# Patient Record
Sex: FEMALE | Race: BLACK OR AFRICAN AMERICAN | NOT HISPANIC OR LATINO | Employment: UNEMPLOYED | URBAN - METROPOLITAN AREA
[De-identification: names, ages, dates, MRNs, and addresses within clinical notes are randomized per-mention and may not be internally consistent; named-entity substitution may affect disease eponyms.]

---

## 2023-08-16 PROBLEM — F32.9 MAJOR DEPRESSION: Status: ACTIVE | Noted: 2023-08-16

## 2023-08-16 PROBLEM — M25.532 LEFT WRIST PAIN: Status: ACTIVE | Noted: 2023-08-16

## 2023-08-16 PROBLEM — G47.00 INSOMNIA: Status: ACTIVE | Noted: 2023-08-16

## 2023-08-16 PROBLEM — M19.132 SCAPHOLUNATE ADVANCED COLLAPSE OF LEFT WRIST: Status: ACTIVE | Noted: 2023-08-16

## 2023-08-16 PROBLEM — F41.9 ANXIETY: Status: ACTIVE | Noted: 2023-08-16

## 2023-08-16 PROBLEM — M19.131 POST-TRAUMATIC ARTHRITIS OF RIGHT WRIST: Status: ACTIVE | Noted: 2023-08-16

## 2023-08-16 PROBLEM — F43.10 POST TRAUMATIC STRESS DISORDER: Status: ACTIVE | Noted: 2023-08-16

## 2023-08-16 PROBLEM — M77.8 RIGHT HAND TENDONITIS: Status: ACTIVE | Noted: 2023-08-16

## 2023-08-16 PROBLEM — G89.29 CHRONIC PATELLOFEMORAL PAIN OF LEFT KNEE: Status: ACTIVE | Noted: 2023-08-16

## 2023-08-16 PROBLEM — M25.562 CHRONIC PATELLOFEMORAL PAIN OF LEFT KNEE: Status: ACTIVE | Noted: 2023-08-16

## 2023-08-16 PROBLEM — F43.21 GRIEF: Status: ACTIVE | Noted: 2023-08-16

## 2023-08-16 PROBLEM — R52 PAIN: Status: ACTIVE | Noted: 2023-08-16

## 2023-08-16 RX ORDER — CYCLOBENZAPRINE HCL 5 MG
TABLET ORAL
COMMUNITY
End: 2023-12-21 | Stop reason: ALTCHOICE

## 2023-08-16 RX ORDER — BACITRACIN 500 [USP'U]/G
1 OINTMENT OPHTHALMIC EVERY 6 HOURS PRN
COMMUNITY
Start: 2014-06-04 | End: 2023-12-21 | Stop reason: ALTCHOICE

## 2023-08-16 RX ORDER — AMLODIPINE BESYLATE 2.5 MG/1
1 TABLET ORAL DAILY
COMMUNITY

## 2023-08-16 RX ORDER — LORATADINE 10 MG/1
TABLET ORAL
COMMUNITY
Start: 2018-06-06 | End: 2023-12-21 | Stop reason: ALTCHOICE

## 2023-08-16 RX ORDER — TRIAMCINOLONE ACETONIDE 5 MG/G
CREAM TOPICAL DAILY PRN
COMMUNITY

## 2023-08-16 RX ORDER — VALACYCLOVIR HYDROCHLORIDE 1 G/1
1000 TABLET, FILM COATED ORAL 2 TIMES DAILY PRN
COMMUNITY
End: 2024-06-02

## 2023-08-16 RX ORDER — MELOXICAM 15 MG/1
TABLET ORAL
COMMUNITY
End: 2023-12-21 | Stop reason: ALTCHOICE

## 2023-08-16 RX ORDER — DOXEPIN 6 MG/1
1 TABLET, FILM COATED ORAL NIGHTLY PRN
COMMUNITY
Start: 2022-05-10 | End: 2024-05-07 | Stop reason: SDUPTHER

## 2023-08-16 RX ORDER — KETOROLAC TROMETHAMINE 10 MG/1
1 TABLET, FILM COATED ORAL EVERY 6 HOURS PRN
COMMUNITY
End: 2023-12-21 | Stop reason: ALTCHOICE

## 2023-08-16 RX ORDER — LISINOPRIL 10 MG/1
TABLET ORAL
COMMUNITY
End: 2023-12-21 | Stop reason: ALTCHOICE

## 2023-08-16 RX ORDER — OXYCODONE AND ACETAMINOPHEN 5; 325 MG/1; MG/1
1 TABLET ORAL 2 TIMES DAILY PRN
COMMUNITY

## 2023-08-16 RX ORDER — PREDNISONE 20 MG/1
TABLET ORAL
COMMUNITY
End: 2023-12-21 | Stop reason: ALTCHOICE

## 2023-08-16 RX ORDER — ATENOLOL AND CHLORTHALIDONE TABLET 50; 25 MG/1; MG/1
1 TABLET ORAL DAILY
COMMUNITY

## 2023-08-16 RX ORDER — ATORVASTATIN CALCIUM 20 MG/1
1 TABLET, FILM COATED ORAL NIGHTLY
COMMUNITY

## 2023-08-16 RX ORDER — IBUPROFEN 600 MG/1
TABLET ORAL
COMMUNITY
End: 2023-12-21 | Stop reason: ALTCHOICE

## 2023-08-16 RX ORDER — DOCUSATE SODIUM 100 MG/1
1 CAPSULE, LIQUID FILLED ORAL DAILY PRN
COMMUNITY
End: 2024-06-02

## 2023-08-16 RX ORDER — SERTRALINE HYDROCHLORIDE 50 MG/1
1 TABLET, FILM COATED ORAL DAILY
COMMUNITY
End: 2024-01-09 | Stop reason: SDUPTHER

## 2023-08-16 RX ORDER — ZOLPIDEM TARTRATE 10 MG/1
1 TABLET ORAL NIGHTLY PRN
COMMUNITY
Start: 2019-04-30 | End: 2024-01-09 | Stop reason: SDUPTHER

## 2023-08-16 RX ORDER — KETOTIFEN FUMARATE 0.35 MG/ML
1 SOLUTION/ DROPS OPHTHALMIC 2 TIMES DAILY PRN
COMMUNITY

## 2023-08-17 RX ORDER — ALBUTEROL SULFATE 0.83 MG/ML
2.5 SOLUTION RESPIRATORY (INHALATION)
COMMUNITY

## 2023-08-17 RX ORDER — ALBUTEROL SULFATE 90 UG/1
2 AEROSOL, METERED RESPIRATORY (INHALATION) EVERY 6 HOURS PRN
COMMUNITY

## 2023-10-02 ENCOUNTER — APPOINTMENT (OUTPATIENT)
Dept: BEHAVIORAL HEALTH | Facility: CLINIC | Age: 48
End: 2023-10-02
Payer: MEDICAID

## 2023-10-03 ENCOUNTER — APPOINTMENT (OUTPATIENT)
Dept: BEHAVIORAL HEALTH | Facility: CLINIC | Age: 48
End: 2023-10-03
Payer: MEDICAID

## 2023-10-04 ENCOUNTER — APPOINTMENT (OUTPATIENT)
Dept: BEHAVIORAL HEALTH | Facility: CLINIC | Age: 48
End: 2023-10-04
Payer: MEDICAID

## 2023-10-05 ENCOUNTER — APPOINTMENT (OUTPATIENT)
Dept: BEHAVIORAL HEALTH | Facility: CLINIC | Age: 48
End: 2023-10-05
Payer: MEDICAID

## 2023-10-05 ENCOUNTER — SOCIAL WORK (OUTPATIENT)
Dept: BEHAVIORAL HEALTH | Facility: CLINIC | Age: 48
End: 2023-10-05
Payer: MEDICAID

## 2023-10-05 DIAGNOSIS — F43.10 PTSD (POST-TRAUMATIC STRESS DISORDER): ICD-10-CM

## 2023-10-05 PROCEDURE — 90834 PSYTX W PT 45 MINUTES: CPT
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
## 2023-10-10 ENCOUNTER — CLINICAL SUPPORT (OUTPATIENT)
Dept: BEHAVIORAL HEALTH | Facility: CLINIC | Age: 48
End: 2023-10-10
Payer: MEDICAID

## 2023-10-10 DIAGNOSIS — F43.10 PTSD (POST-TRAUMATIC STRESS DISORDER): ICD-10-CM

## 2023-10-10 DIAGNOSIS — F33.2 SEVERE RECURRENT MAJOR DEPRESSION WITHOUT PSYCHOTIC FEATURES (MULTI): ICD-10-CM

## 2023-10-10 DIAGNOSIS — F41.1 GENERALIZED ANXIETY DISORDER: ICD-10-CM

## 2023-10-10 PROCEDURE — 90853 GROUP PSYCHOTHERAPY: CPT | Mod: GT,HE,U2
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
## 2023-10-19 ENCOUNTER — SOCIAL WORK (OUTPATIENT)
Dept: BEHAVIORAL HEALTH | Facility: CLINIC | Age: 48
End: 2023-10-19
Payer: MEDICAID

## 2023-10-19 DIAGNOSIS — F33.1 MODERATE EPISODE OF RECURRENT MAJOR DEPRESSIVE DISORDER (MULTI): ICD-10-CM

## 2023-10-19 PROCEDURE — 90834 PSYTX W PT 45 MINUTES: CPT

## 2023-10-19 NOTE — PROGRESS NOTES
Type of Interaction: Virtual     Start Time: 10:00am     End Time: 10:45am     Patient states that she's been trying to stay positive despite her continued health issues. Patient's pain her hands has worsened with the cooler weather. Patient went to the ER yesterday and was given pain medication. Patient went to the ER yesterday due to the pain in her hands. Patient is reluctant to take the medication because of the side effects. Patient receptive to seeking services at [facility]. Patient sleeps intermittently throughout the day. The last time she left the house was one week ago. Patient spoke with her son (who is incarcerated) over the phone this week. Patient is trying to rebuild a relationship with her son. She's planning on going to visit him with her daughter. Patient discussing her son's childhood and the role his father played.  Patient's cousin is coming over this Sunday for dinner and she's looking forward to this. Plan to meet again in two weeks.      Appointment: {Appointment:81385}        Interventions Provided: Solution Focused Therapy        Progress Made: Moderate     Response to Intervention: Receptive      Plan: Continue to use coping skills such as journaling and positive aspirations to manage depressive symptoms. Will forward information on [facility] to patient.      Follow Up / Next Appointment:  November 2nd at 11am
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
## 2023-11-30 ENCOUNTER — SOCIAL WORK (OUTPATIENT)
Dept: BEHAVIORAL HEALTH | Facility: CLINIC | Age: 48
End: 2023-11-30
Payer: MEDICAID

## 2023-11-30 DIAGNOSIS — F41.9 ANXIETY AND DEPRESSION: ICD-10-CM

## 2023-11-30 DIAGNOSIS — F32.A ANXIETY AND DEPRESSION: ICD-10-CM

## 2023-11-30 PROCEDURE — 90832 PSYTX W PT 30 MINUTES: CPT

## 2023-11-30 NOTE — PROGRESS NOTES
Collaborative Care (CoCM)  Progress Note    Type of Interaction: Virtual    Start Time: 9:00am    End Time: 10:00am    Patient reports that she has scheduled surgery on her hands. Patients is appropriately nervous, wants it to be successful. Patient is trying to stay positive during this time. Patient's daughter has been staying at her house with her. Patient states that having someone else in the house helps to keep her awake during the day.  Patient has been walking on her treadmill. This has helped with both her energy level and mood. Patient wants to be consistent with this. She has an upcoming court hearing for her workman's compensation. Encouraged patient that things seem to be coming forward in a positive way.     Appointment: Scheduled      Interventions Provided: Solution Focused Therapy      Progress Made: Moderate    Response to Intervention: Receptive     Plan:   Continue to encourage self care with patient having a routine to her day     Follow Up / Next Appointment:      December 22nd at 9am

## 2023-12-14 ENCOUNTER — SOCIAL WORK (OUTPATIENT)
Dept: BEHAVIORAL HEALTH | Facility: CLINIC | Age: 48
End: 2023-12-14
Payer: MEDICAID

## 2023-12-14 DIAGNOSIS — F33.1 MODERATE EPISODE OF RECURRENT MAJOR DEPRESSIVE DISORDER (MULTI): ICD-10-CM

## 2023-12-14 PROCEDURE — 90834 PSYTX W PT 45 MINUTES: CPT
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
## 2023-12-15 DIAGNOSIS — M19.131 POSTTRAUMATIC ARTHRITIS OF WRIST, RIGHT: ICD-10-CM

## 2023-12-17 PROBLEM — M19.131: Status: ACTIVE | Noted: 2023-12-15

## 2023-12-21 ENCOUNTER — OFFICE VISIT (OUTPATIENT)
Dept: ORTHOPEDIC SURGERY | Facility: CLINIC | Age: 48
End: 2023-12-21
Payer: MEDICAID

## 2023-12-21 ENCOUNTER — TELEMEDICINE CLINICAL SUPPORT (OUTPATIENT)
Dept: PREADMISSION TESTING | Facility: HOSPITAL | Age: 48
End: 2023-12-21
Payer: MEDICAID

## 2023-12-21 VITALS — HEIGHT: 63 IN | BODY MASS INDEX: 34.73 KG/M2 | WEIGHT: 196 LBS

## 2023-12-21 VITALS — BODY MASS INDEX: 34.73 KG/M2 | WEIGHT: 196 LBS | HEIGHT: 63 IN

## 2023-12-21 DIAGNOSIS — M19.131 POST-TRAUMATIC ARTHRITIS OF RIGHT WRIST: Primary | ICD-10-CM

## 2023-12-21 DIAGNOSIS — Z02.6 ENCOUNTER RELATED TO WORKER'S COMPENSATION CLAIM: ICD-10-CM

## 2023-12-21 PROCEDURE — 99213 OFFICE O/P EST LOW 20 MIN: CPT | Performed by: ORTHOPAEDIC SURGERY

## 2023-12-21 PROCEDURE — 1036F TOBACCO NON-USER: CPT | Performed by: ORTHOPAEDIC SURGERY

## 2023-12-21 RX ORDER — ASCORBIC ACID 500 MG
1000 TABLET ORAL DAILY
COMMUNITY
End: 2024-06-02

## 2023-12-21 RX ORDER — VITAMIN E MIXED 400 UNIT
400 CAPSULE ORAL DAILY
COMMUNITY
End: 2024-06-02

## 2023-12-21 ASSESSMENT — PAIN DESCRIPTION - DESCRIPTORS: DESCRIPTORS: ACHING

## 2023-12-21 ASSESSMENT — PAIN SCALES - GENERAL: PAINLEVEL_OUTOF10: 6

## 2023-12-21 ASSESSMENT — PAIN - FUNCTIONAL ASSESSMENT: PAIN_FUNCTIONAL_ASSESSMENT: 0-10

## 2023-12-21 NOTE — H&P (VIEW-ONLY)
Patient returns for her wrist. She is on my OR schedule next week for conversion of a midcarpal fusion of the right wrist to a total wrist fusion. She is eagerly anticipating that surgery to help with her pain relief. She is here primarily for her left side. 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     Past medical history, medications, allergies, surgical history and review of systems are reviewed and otherwise unchanged when compared to last visit on 9/05/2023.    Physical Examination Findings:   Constitutional: Oriented to person, place, and time. Appears well-developed and well-nourished.   Head: Normocephalic and atraumatic.   Eyes: Pupils are equal, round, and reactive to light.   Cardiovascular: Intact distal pulses.   Pulmonary/Chest/Breast: Effort normal. No respiratory distress.   Neurological: Alert and oriented to person, place, and time.   Skin: Skin is warm and dry.   Psychiatric: Normal mood and affect. Behavior is normal.   Musculoskeletal: Right wrist examination reveals tenderness to palpation over the dorsal radiocarpal joint. She has limitations to wrist flexion and extension. She maintains near full forearm rotation and near full digital range of motion. Sensation subjectively normal today. Mildly positive provocative maneuvers for carpal tunnel syndrome.     Impression: Bilateral wrist arthritis    Plan: We will proceed with the right wrist fusion next week but I have recommended that we hold off on submitting a C-9 for her left wrist surgery because we will not be able to perform it before the C-9 expires. As she is getting through her recovery from her right wrist surgery and she feels that she is ready to give up her left hand, that is when I will submit the C-9 and request approval for a left  wrist proximal row carpectomy.     Emilio Paz MD    [facility] [employer]  Department of Orthopaedic Surgery  Chief of Hand and Upper Extremity Surgery  [facility]    By signing my name below, I, Bishop Johansen, attest that this documentation has been prepared under the direction and in the presence of Dr. Emilio Paz.   All medical record entries made by the Jennieibe were at my direction and personally dictated by me. I have reviewed the chart and agree that the record accurately reflects my personal performance of the history, physical exam, discussion and plan.

## 2023-12-21 NOTE — PREPROCEDURE INSTRUCTIONS
Current Medications   Medication Instructions    albuterol 2.5 mg /3 mL (0.083 %) nebulizer solution Use as needed    albuterol 90 mcg/actuation inhaler Uses as needed    amLODIPine (Norvasc) 2.5 mg tablet Take morning of surgery with sip of water, no other fluids, takes at night    ascorbic acid (Vitamin C) 500 mg tablet Stop 7 days before surgery    atenoloL-chlorthalidone (Tenoretic) 50-25 mg tablet Take morning of surgery with sip of water, no other fluids (takes at night)    atorvastatin (Lipitor) 20 mg tablet Ok to take night before    B complex-vitamin C-folic acid (Nephro-Altagracia Rx) 1- mg-mg-mcg tablet Stop 7 days before surgery    cetirizine (ZyrTEC) 10 mg capsule Continue until night before surgery, takes at night    docusate sodium (Colace) 100 mg capsule Stop 1 day before surgery    doxepin (Silenor) 6 mg tablet Continue until night before surgery    oxyCODONE-acetaminophen (Percocet) 5-325 mg tablet Take morning of surgery with sip of water, no other fluids as needed    sertraline (Zoloft) 50 mg tablet Continue until night before surgery, takes at night    triamcinolone (Kenalog) 0.5 % cream Stop 1 day before surgery    vitamin E 180 mg (400 unit) capsule Stop 7 days before surgery    zolpidem (Ambien) 10 mg tablet Continue until night before surgery                       NPO Instructions:    Do not eat any food after midnight the night before your surgery/procedure.    Additional Instructions:     Seven/Six Days before Surgery:  We have sent a prescription for Hibiclens soap to your preferred pharmacy.  If you have not already, Please  your prescription and start using five days before surgery.  Follow the instruction sheet provided to you at your CPM/PAT appointment.  Five Days before Surgery:  Review your medication instructions, stop indicated medications  Three Days before Surgery:  Review your medication instructions, stop indicated medications  The Day before Surgery:  Review your  medication instructions, stop indicated medications  Day of Surgery:  Review your medication instructions, take indicated medications  Wear  comfortable loose fitting clothing  Do not use moisturizers, creams, lotions or perfume  All jewelry and valuables should be left at home      All instructions given via phone screening.

## 2023-12-21 NOTE — PROGRESS NOTES
Patient returns for her wrist. She is on my OR schedule next week for conversion of a midcarpal fusion of the right wrist to a total wrist fusion. She is eagerly anticipating that surgery to help with her pain relief. She is here primarily for her left side. 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     Past medical history, medications, allergies, surgical history and review of systems are reviewed and otherwise unchanged when compared to last visit on 9/05/2023.    Physical Examination Findings:   Constitutional: Oriented to person, place, and time. Appears well-developed and well-nourished.   Head: Normocephalic and atraumatic.   Eyes: Pupils are equal, round, and reactive to light.   Cardiovascular: Intact distal pulses.   Pulmonary/Chest/Breast: Effort normal. No respiratory distress.   Neurological: Alert and oriented to person, place, and time.   Skin: Skin is warm and dry.   Psychiatric: Normal mood and affect. Behavior is normal.   Musculoskeletal: Right wrist examination reveals tenderness to palpation over the dorsal radiocarpal joint. She has limitations to wrist flexion and extension. She maintains near full forearm rotation and near full digital range of motion. Sensation subjectively normal today. Mildly positive provocative maneuvers for carpal tunnel syndrome.     Impression: Bilateral wrist arthritis    Plan: We will proceed with the right wrist fusion next week but I have recommended that we hold off on submitting a C-9 for her left wrist surgery because we will not be able to perform it before the C-9 expires. As she is getting through her recovery from her right wrist surgery and she feels that she is ready to give up her left hand, that is when I will submit the C-9 and request approval for a left  wrist proximal row carpectomy.     Emilio Paz MD    [facility] [employer]  Department of Orthopaedic Surgery  Chief of Hand and Upper Extremity Surgery  [facility]    By signing my name below, I, Bishop Johansen, attest that this documentation has been prepared under the direction and in the presence of Dr. Emilio Paz.   All medical record entries made by the Jennieibe were at my direction and personally dictated by me. I have reviewed the chart and agree that the record accurately reflects my personal performance of the history, physical exam, discussion and plan.

## 2023-12-21 NOTE — NURSING NOTE
Phone screening done 12/21/23.  All meds and preop instructions given and explained. Edilbetro COUCH

## 2023-12-22 ENCOUNTER — SOCIAL WORK (OUTPATIENT)
Dept: BEHAVIORAL HEALTH | Facility: CLINIC | Age: 48
End: 2023-12-22
Payer: MEDICAID

## 2023-12-22 DIAGNOSIS — F43.23 ADJUSTMENT DISORDER WITH MIXED ANXIETY AND DEPRESSED MOOD: ICD-10-CM

## 2023-12-22 PROCEDURE — 90832 PSYTX W PT 30 MINUTES: CPT
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
## 2023-12-26 ENCOUNTER — APPOINTMENT (OUTPATIENT)
Dept: BEHAVIORAL HEALTH | Facility: CLINIC | Age: 48
End: 2023-12-26
Payer: COMMERCIAL

## 2023-12-27 ENCOUNTER — HOSPITAL ENCOUNTER (OUTPATIENT)
Facility: HOSPITAL | Age: 48
Setting detail: OUTPATIENT SURGERY
Discharge: HOME | End: 2023-12-27
Attending: ORTHOPAEDIC SURGERY | Admitting: ORTHOPAEDIC SURGERY
Payer: COMMERCIAL

## 2023-12-27 ENCOUNTER — ANESTHESIA (OUTPATIENT)
Dept: OPERATING ROOM | Facility: HOSPITAL | Age: 48
End: 2023-12-27
Payer: COMMERCIAL

## 2023-12-27 ENCOUNTER — ANESTHESIA EVENT (OUTPATIENT)
Dept: OPERATING ROOM | Facility: HOSPITAL | Age: 48
End: 2023-12-27
Payer: COMMERCIAL

## 2023-12-27 ENCOUNTER — APPOINTMENT (OUTPATIENT)
Dept: RADIOLOGY | Facility: HOSPITAL | Age: 48
End: 2023-12-27
Payer: COMMERCIAL

## 2023-12-27 VITALS
OXYGEN SATURATION: 97 % | RESPIRATION RATE: 13 BRPM | DIASTOLIC BLOOD PRESSURE: 90 MMHG | SYSTOLIC BLOOD PRESSURE: 137 MMHG | WEIGHT: 193.34 LBS | HEIGHT: 63 IN | TEMPERATURE: 97 F | BODY MASS INDEX: 34.26 KG/M2 | HEART RATE: 69 BPM

## 2023-12-27 DIAGNOSIS — G89.18 POST-OP PAIN: ICD-10-CM

## 2023-12-27 DIAGNOSIS — M19.131 POSTTRAUMATIC ARTHRITIS OF WRIST, RIGHT: Primary | ICD-10-CM

## 2023-12-27 LAB — HCG UR QL IA.RAPID: NEGATIVE

## 2023-12-27 PROCEDURE — 7100000002 HC RECOVERY ROOM TIME - EACH INCREMENTAL 1 MINUTE: Performed by: ORTHOPAEDIC SURGERY

## 2023-12-27 PROCEDURE — 7100000010 HC PHASE TWO TIME - EACH INCREMENTAL 1 MINUTE: Performed by: ORTHOPAEDIC SURGERY

## 2023-12-27 PROCEDURE — 3600000008 HC OR TIME - EACH INCREMENTAL 1 MINUTE - PROCEDURE LEVEL THREE: Performed by: ORTHOPAEDIC SURGERY

## 2023-12-27 PROCEDURE — 81025 URINE PREGNANCY TEST: CPT | Performed by: ORTHOPAEDIC SURGERY

## 2023-12-27 PROCEDURE — 3600000003 HC OR TIME - INITIAL BASE CHARGE - PROCEDURE LEVEL THREE: Performed by: ORTHOPAEDIC SURGERY

## 2023-12-27 PROCEDURE — 76000 FLUOROSCOPY <1 HR PHYS/QHP: CPT | Mod: 59

## 2023-12-27 PROCEDURE — 2500000004 HC RX 250 GENERAL PHARMACY W/ HCPCS (ALT 636 FOR OP/ED): Performed by: ANESTHESIOLOGY

## 2023-12-27 PROCEDURE — 2780000003 HC OR 278 NO HCPCS: Performed by: ORTHOPAEDIC SURGERY

## 2023-12-27 PROCEDURE — 3700000002 HC GENERAL ANESTHESIA TIME - EACH INCREMENTAL 1 MINUTE: Performed by: ORTHOPAEDIC SURGERY

## 2023-12-27 PROCEDURE — 2500000004 HC RX 250 GENERAL PHARMACY W/ HCPCS (ALT 636 FOR OP/ED): Performed by: ORTHOPAEDIC SURGERY

## 2023-12-27 PROCEDURE — 25825 ARTHRD WRIST WITH AUTOGRAFT: CPT | Performed by: ORTHOPAEDIC SURGERY

## 2023-12-27 PROCEDURE — 2500000004 HC RX 250 GENERAL PHARMACY W/ HCPCS (ALT 636 FOR OP/ED): Performed by: NURSE ANESTHETIST, CERTIFIED REGISTERED

## 2023-12-27 PROCEDURE — 7100000009 HC PHASE TWO TIME - INITIAL BASE CHARGE: Performed by: ORTHOPAEDIC SURGERY

## 2023-12-27 PROCEDURE — 7100000001 HC RECOVERY ROOM TIME - INITIAL BASE CHARGE: Performed by: ORTHOPAEDIC SURGERY

## 2023-12-27 PROCEDURE — A25800 PR FUSION OF WRIST JOINT: Performed by: NURSE ANESTHETIST, CERTIFIED REGISTERED

## 2023-12-27 PROCEDURE — 3700000001 HC GENERAL ANESTHESIA TIME - INITIAL BASE CHARGE: Performed by: ORTHOPAEDIC SURGERY

## 2023-12-27 PROCEDURE — C1713 ANCHOR/SCREW BN/BN,TIS/BN: HCPCS | Performed by: ORTHOPAEDIC SURGERY

## 2023-12-27 PROCEDURE — 2500000001 HC RX 250 WO HCPCS SELF ADMINISTERED DRUGS (ALT 637 FOR MEDICARE OP): Performed by: ANESTHESIOLOGY

## 2023-12-27 PROCEDURE — A25800 PR FUSION OF WRIST JOINT: Performed by: ANESTHESIOLOGY

## 2023-12-27 DEVICE — IMPLANTABLE DEVICE: Type: IMPLANTABLE DEVICE | Site: WRIST | Status: FUNCTIONAL

## 2023-12-27 RX ORDER — SODIUM CHLORIDE, SODIUM LACTATE, POTASSIUM CHLORIDE, CALCIUM CHLORIDE 600; 310; 30; 20 MG/100ML; MG/100ML; MG/100ML; MG/100ML
50 INJECTION, SOLUTION INTRAVENOUS CONTINUOUS
Status: DISCONTINUED | OUTPATIENT
Start: 2023-12-27 | End: 2023-12-27 | Stop reason: HOSPADM

## 2023-12-27 RX ORDER — ONDANSETRON HYDROCHLORIDE 2 MG/ML
INJECTION, SOLUTION INTRAVENOUS AS NEEDED
Status: DISCONTINUED | OUTPATIENT
Start: 2023-12-27 | End: 2023-12-27

## 2023-12-27 RX ORDER — FAMOTIDINE 20 MG/1
20 TABLET, FILM COATED ORAL ONCE
Status: COMPLETED | OUTPATIENT
Start: 2023-12-27 | End: 2023-12-27

## 2023-12-27 RX ORDER — FENTANYL CITRATE 50 UG/ML
50 INJECTION, SOLUTION INTRAMUSCULAR; INTRAVENOUS ONCE
Status: COMPLETED | OUTPATIENT
Start: 2023-12-27 | End: 2023-12-27

## 2023-12-27 RX ORDER — OXYCODONE HCL 10 MG/1
10 TABLET, FILM COATED, EXTENDED RELEASE ORAL ONCE AS NEEDED
Status: DISCONTINUED | OUTPATIENT
Start: 2023-12-27 | End: 2023-12-27 | Stop reason: HOSPADM

## 2023-12-27 RX ORDER — MEPERIDINE HYDROCHLORIDE 25 MG/ML
12.5 INJECTION INTRAMUSCULAR; INTRAVENOUS; SUBCUTANEOUS EVERY 10 MIN PRN
Status: DISCONTINUED | OUTPATIENT
Start: 2023-12-27 | End: 2023-12-27 | Stop reason: HOSPADM

## 2023-12-27 RX ORDER — ALBUTEROL SULFATE 0.83 MG/ML
2.5 SOLUTION RESPIRATORY (INHALATION) ONCE AS NEEDED
Status: DISCONTINUED | OUTPATIENT
Start: 2023-12-27 | End: 2023-12-27 | Stop reason: HOSPADM

## 2023-12-27 RX ORDER — FENTANYL CITRATE 50 UG/ML
INJECTION, SOLUTION INTRAMUSCULAR; INTRAVENOUS AS NEEDED
Status: DISCONTINUED | OUTPATIENT
Start: 2023-12-27 | End: 2023-12-27

## 2023-12-27 RX ORDER — KETOROLAC TROMETHAMINE 30 MG/ML
INJECTION, SOLUTION INTRAMUSCULAR; INTRAVENOUS AS NEEDED
Status: DISCONTINUED | OUTPATIENT
Start: 2023-12-27 | End: 2023-12-27

## 2023-12-27 RX ORDER — DIPHENHYDRAMINE HYDROCHLORIDE 50 MG/ML
12.5 INJECTION INTRAMUSCULAR; INTRAVENOUS ONCE AS NEEDED
Status: DISCONTINUED | OUTPATIENT
Start: 2023-12-27 | End: 2023-12-27 | Stop reason: HOSPADM

## 2023-12-27 RX ORDER — MIDAZOLAM HYDROCHLORIDE 1 MG/ML
2 INJECTION, SOLUTION INTRAMUSCULAR; INTRAVENOUS ONCE
Status: COMPLETED | OUTPATIENT
Start: 2023-12-27 | End: 2023-12-27

## 2023-12-27 RX ORDER — ALBUTEROL SULFATE 0.83 MG/ML
2.5 SOLUTION RESPIRATORY (INHALATION) ONCE
Status: DISCONTINUED | OUTPATIENT
Start: 2023-12-27 | End: 2023-12-27 | Stop reason: HOSPADM

## 2023-12-27 RX ORDER — OXYCODONE HYDROCHLORIDE 5 MG/1
5 TABLET ORAL EVERY 6 HOURS PRN
Qty: 28 TABLET | Refills: 0 | Status: SHIPPED | OUTPATIENT
Start: 2023-12-27 | End: 2024-01-03

## 2023-12-27 RX ORDER — ONDANSETRON HYDROCHLORIDE 2 MG/ML
4 INJECTION, SOLUTION INTRAVENOUS ONCE AS NEEDED
Status: DISCONTINUED | OUTPATIENT
Start: 2023-12-27 | End: 2023-12-27 | Stop reason: HOSPADM

## 2023-12-27 RX ORDER — METOCLOPRAMIDE 10 MG/1
10 TABLET ORAL ONCE
Status: COMPLETED | OUTPATIENT
Start: 2023-12-27 | End: 2023-12-27

## 2023-12-27 RX ORDER — LABETALOL HYDROCHLORIDE 5 MG/ML
10 INJECTION, SOLUTION INTRAVENOUS ONCE AS NEEDED
Status: DISCONTINUED | OUTPATIENT
Start: 2023-12-27 | End: 2023-12-27 | Stop reason: HOSPADM

## 2023-12-27 RX ORDER — CLINDAMYCIN PHOSPHATE 900 MG/50ML
900 INJECTION, SOLUTION INTRAVENOUS ONCE
Status: COMPLETED | OUTPATIENT
Start: 2023-12-27 | End: 2023-12-27

## 2023-12-27 RX ORDER — HYDRALAZINE HYDROCHLORIDE 20 MG/ML
10 INJECTION INTRAMUSCULAR; INTRAVENOUS EVERY 30 MIN PRN
Status: DISCONTINUED | OUTPATIENT
Start: 2023-12-27 | End: 2023-12-27 | Stop reason: HOSPADM

## 2023-12-27 RX ORDER — PROPOFOL 10 MG/ML
INJECTION, EMULSION INTRAVENOUS AS NEEDED
Status: DISCONTINUED | OUTPATIENT
Start: 2023-12-27 | End: 2023-12-27

## 2023-12-27 RX ADMIN — FENTANYL CITRATE 25 MCG: 50 INJECTION INTRAMUSCULAR; INTRAVENOUS at 11:06

## 2023-12-27 RX ADMIN — METOCLOPRAMIDE 10 MG: 10 TABLET ORAL at 09:22

## 2023-12-27 RX ADMIN — KETOROLAC TROMETHAMINE 30 MG: 30 INJECTION, SOLUTION INTRAMUSCULAR; INTRAVENOUS at 11:10

## 2023-12-27 RX ADMIN — MIDAZOLAM 2 MG: 1 INJECTION INTRAMUSCULAR; INTRAVENOUS at 09:32

## 2023-12-27 RX ADMIN — SODIUM CHLORIDE, SODIUM LACTATE, POTASSIUM CHLORIDE, AND CALCIUM CHLORIDE 50 ML/HR: 600; 310; 30; 20 INJECTION, SOLUTION INTRAVENOUS at 09:22

## 2023-12-27 RX ADMIN — FENTANYL CITRATE 50 MCG: 0.05 INJECTION, SOLUTION INTRAMUSCULAR; INTRAVENOUS at 09:31

## 2023-12-27 RX ADMIN — PROPOFOL 200 MG: 10 INJECTION, EMULSION INTRAVENOUS at 09:51

## 2023-12-27 RX ADMIN — CLINDAMYCIN IN 5 PERCENT DEXTROSE 900 MG: 18 INJECTION, SOLUTION INTRAVENOUS at 09:47

## 2023-12-27 RX ADMIN — FAMOTIDINE 20 MG: 20 TABLET, FILM COATED ORAL at 09:22

## 2023-12-27 RX ADMIN — FENTANYL CITRATE 25 MCG: 50 INJECTION INTRAMUSCULAR; INTRAVENOUS at 10:13

## 2023-12-27 RX ADMIN — FENTANYL CITRATE 50 MCG: 50 INJECTION INTRAMUSCULAR; INTRAVENOUS at 09:50

## 2023-12-27 RX ADMIN — ONDANSETRON 4 MG: 2 INJECTION INTRAMUSCULAR; INTRAVENOUS at 11:10

## 2023-12-27 ASSESSMENT — PAIN SCALES - GENERAL
PAINLEVEL_OUTOF10: 0 - NO PAIN
PAIN_LEVEL: 2
PAINLEVEL_OUTOF10: 0 - NO PAIN
PAINLEVEL_OUTOF10: 0 - NO PAIN

## 2023-12-27 ASSESSMENT — PAIN - FUNCTIONAL ASSESSMENT
PAIN_FUNCTIONAL_ASSESSMENT: 0-10

## 2023-12-27 ASSESSMENT — COLUMBIA-SUICIDE SEVERITY RATING SCALE - C-SSRS
6. HAVE YOU EVER DONE ANYTHING, STARTED TO DO ANYTHING, OR PREPARED TO DO ANYTHING TO END YOUR LIFE?: NO
1. IN THE PAST MONTH, HAVE YOU WISHED YOU WERE DEAD OR WISHED YOU COULD GO TO SLEEP AND NOT WAKE UP?: NO
2. HAVE YOU ACTUALLY HAD ANY THOUGHTS OF KILLING YOURSELF?: NO

## 2023-12-27 ASSESSMENT — PAIN DESCRIPTION - DESCRIPTORS: DESCRIPTORS: ACHING

## 2023-12-27 NOTE — OP NOTE
Right wrist arthrodesis (R) Operative Note     Date: 2023  OR Location: [facility]    Name: Rosanna Mckeon, : 1975, Age: 48 y.o., MRN: [id number], Sex: female    Diagnosis  Pre-op Diagnosis     * Posttraumatic arthritis of wrist, right [M19.131] Post-op Diagnosis     * Posttraumatic arthritis of wrist, right [M19.131]     Procedures  Right wrist arthrodesis  66058 - SC ARTHRODESIS WRIST COMPLETE W/O BONE GRAFT      Surgeons      * Emilio Paz - Primary    Resident/Fellow/Other Assistant:  Surgeon(s) and Role:    Procedure Summary  Anesthesia: General  ASA: III  Anesthesia Staff: Anesthesiologist: Daniel Haley DO  CRNA: BAUDILIO Henderson-CRNA  Estimated Blood Loss: 2 mL  Intra-op Medications: * No intraprocedure medications in log *           Anesthesia Record               Intraprocedure I/O Totals       None           Specimen: No specimens collected     Staff:   Circulator: Marcelle Cannon RN  Scrub Person: Anastacia Hernandez PA-C; Mary Mai         Drains and/or Catheters: * None in log *    Tourniquet Times:   * Missing tourniquet times found for documented tourniquets in lo *     Implants:  Implants       Type Name Action Serial No.       wrist fusion plate Implanted       14 inch 2.7 non locking screw Implanted       12 inch 2.7 non locking screw Implanted       16 inch 3.5 non locking screw Implanted       14 inch 3.5 non locking screw Implanted       16 inch 3.5 locking screw Implanted       14 inch 2.7 locking screw Implanted               Findings: Advanced radiocarpal joint arthritis right wrist    Indications: Rosanna Mckeon is an 48 y.o. female who is having surgery for Posttraumatic arthritis of wrist, right [M19.131].      The patient was seen in the preoperative area. The risks, benefits, complications, treatment options, non-operative alternatives, expected recovery and outcomes were discussed with the patient. The possibilities of reaction to medication, pulmonary  aspiration, injury to surrounding structures, bleeding, recurrent infection, the need for additional procedures, failure to diagnose a condition, and creating a complication requiring transfusion or operation were discussed with the patient. The patient concurred with the proposed plan, giving informed consent.  The site of surgery was properly noted/marked if necessary per policy. The patient has been actively warmed in preoperative area. Preoperative antibiotics have been ordered and given within 1 hours of incision. Venous thrombosis prophylaxis have been ordered including bilateral sequential compression devices    Procedure Details:   48-year-old right-hand-dominant female presents today for right wrist total arthrodesis.  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  She had surgery by another surgeon at another institution several years ago for scaphoid excision and midcarpal fusion.  She presented to me following that procedure with pain in her wrist joint thought to be related to prominent hardware in the radiocarpal joint.  We removed the implants and identified full-thickness cartilage loss in the lunate facet.  We have attempted to manage her pain with conservative measures but have been unsuccessful despite conservative management options and pain management.  She has not been able to return to her previous occupation.  She presents now for conversion of her midcarpal fusion to a total wrist fusion.  Preoperatively the right arm was identified and marked for surgery.  Informed consent process was completed.    Patient was brought to the operating room placed supine on the operating table.  Timeout procedure was performed to verify the correct patient procedure and operative site.  Intravenous antibiotics were administered.  General anesthetic was initiated by the anesthesia service.  The right upper  extremity was then prepped and draped in usual sterile fashion.  Limb was exsanguinated and tourniquet was inflated to 250 mmHg.    We used the prior dorsal longitudinal incision for exposure of the wrist joint.  This incision was extended proximally along the dorsal aspect of the radius and distally out to the middle finger metacarpal neck.  We dissected down to the deep fascia.  We identified the extensor pollicis longus tendon which had been rerouted subcutaneously at prior surgery.  This was mobilized and retracted radially.  We then identified the fourth compartment.  The extensor retinaculum of the fourth compartment was released allowing us to retract the fourth compartment tendons ulnarly.  There were dense adhesions between the EDC tendons within that compartment and a tenolysis was performed.  The second compartment was mobilized in a radial direction.  The posterior interosseous nerve had already been divided as part of one of her prior procedures.  We did expose the interosseous membrane proximal to the distal oblique band of the interosseous membrane and created a window in this membrane to reveal the underlying anterior osseous nerve which was then also resected for denervation purposes.    A T-shaped capsulotomy was performed and the capsule was released off of the dorsal rim of the radius taking great care to protect the dorsal radial ulnar ligaments to prevent radial ulnar joint instability.  Once the capsular incision was created and the flaps mobilized we were able to Hyperflex the wrist to expose the proximal surface of the lunate.  Here there was cartilage loss.  This also allowed us to expose the lunate facet where there was a full-thickness cartilage defect likely related to prominence of her prior surgical hardware.  Using a combination of rongeurs and a high-speed bur we removed the remaining cartilage and subchondral bone from the lunate facet of the radius and the proximal articular  surface of the lunate.  Then using a 2 mm drill we also fenestrated the lunate and the radius to improve blood flow and perfusion to the fusion site.  Using a rongeur we removed Monika's tubercle and created a trough in the distal aspect of the dorsal radius for plate positioning.  The bone graft harvested from Monika's tubercle was placed into the radiolunate joint as autogenous bone graft for fusion.    We selected an appropriate plate from the [employer] wrist fusion system.  This was secured to the middle finger metacarpal with 2.7 mm cortical screws distally.  Once the plate position was confirmed appropriate all in the metacarpal we used AO compression technique to secure the plate proximally to the radial shaft.  Fluoroscopic images were obtained again demonstrating appropriate implant position and position of fusion.  We placed two 2.7 mm cortical screws in the most distal holes of the metacarpal plate and two 3.5 mm cortical screws in the most proximal aspect of the plate on the radius.  We then placed 1 additional 3.5 mm locking screw proximally and 1 additional 2.7 mm locking screw distally.  Final fluoroscopic images were obtained and saved to the patient's electronic file.  The wound was copiously irrigated and then closed interrupted fashion.  Sterile bandages were applied and the tourniquet was deflated.  The patient was placed into a well-padded volar wrist splint.  She was awoken from her anesthetic and transferred to recovery in stable condition.    Postoperatively she will be discharged home once comfortable.  She will remain in her splint until she returns to clinic in 2 weeks.  At that time we will transition her into a short arm cast for an additional 3 to 4 weeks before putting her into a removable brace and allowing her to progress with light activities.        Complications:  None; patient tolerated the procedure well.    Disposition: PACU - hemodynamically stable.  Condition: stable          Additional Details:      Attending Attestation: I was present and scrubbed for the entire procedure.    Emilio Paz  Phone Number: [phone]

## 2023-12-27 NOTE — ANESTHESIA PROCEDURE NOTES
Peripheral Block    Patient location during procedure: pre-op  Start time: 12/27/2023 9:37 AM  End time: 12/27/2023 9:39 AM  Reason for block: at surgeon's request  Staffing  Performed: attending   Authorized by: Daniel Haley DO    Performed by: Daniel Haley DO  Preanesthetic Checklist  Completed: patient identified, IV checked, site marked, risks and benefits discussed, surgical consent, monitors and equipment checked, pre-op evaluation and timeout performed   Timeout performed at: 12/27/2023 9:31 AM  Peripheral Block  Patient position: laying flat  Prep: ChloraPrep  Patient monitoring: heart rate, cardiac monitor and continuous pulse ox  Block type: axillary and brachial plexus  Laterality: right  Injection technique: single-shot  Local infiltration: bupivicaine  Infiltration strength: 0.5 %  Dose: 25 mL  Needle  Needle type: short-bevel   Needle gauge: 20 G  Needle length: 10 cm  Needle localization: anatomical landmarks, nerve stimulator and ultrasound guidance  Needle insertion depth: 6 cm  Assessment  Injection assessment: negative aspiration for heme, no paresthesia on injection, incremental injection and local visualized surrounding nerve on ultrasound  Paresthesia pain: none  Heart rate change: no  Slow fractionated injection: no

## 2023-12-27 NOTE — ANESTHESIA PREPROCEDURE EVALUATION
Patient: Rosanna Mckeon    Procedure Information       Date/Time: 12/27/23 1000    Procedure: Right wrist arthrodesis / 75 minutes ( Mini-C-Arm, [place] [employer] wrist fusion plate, Supine, Pre-Surgical Block ) (Right: Wrist)    Location: [facility] / [facility]    Surgeons: Emilio Paz MD            Relevant Problems   Neuro/Psych   (+) Anxiety   (+) Major depression   (+) Post traumatic stress disorder       Clinical information reviewed:   Tobacco  Allergies  Meds   Med Hx  Surg Hx   Fam Hx  Soc Hx        NPO Detail:  NPO/Void Status  Date of Last Liquid: 12/26/23  Time of Last Liquid: 2301  Date of Last Solid: 12/26/23  Time of Last Solid: 2107  Last Intake Type: Clear fluids  Time of Last Void: 0600         Physical Exam    Airway  Mallampati: II  TM distance: >3 FB  Neck ROM: full     Cardiovascular   Rhythm: regular  Rate: normal     Dental - normal exam     Pulmonary   Breath sounds clear to auscultation     Abdominal   Abdomen: soft             Anesthesia Plan    ASA 3     general     intravenous induction   Postoperative administration of opioids is intended.  Anesthetic plan and risks discussed with patient.    Plan discussed with CRNA, attending and CAA.

## 2023-12-27 NOTE — ANESTHESIA PROCEDURE NOTES
Airway  Date/Time: 12/27/2023 9:50 AM  Urgency: elective    Airway not difficult    Staffing  Performed: CRNA   Authorized by: Daniel Haley DO    Performed by: BAUDILIO Henderson-NICCI  Patient location during procedure: OR    Indications and Patient Condition  Indications for airway management: anesthesia and airway protection  Spontaneous ventilation: present  Sedation level: deep  Preoxygenated: yes  Patient position: sniffing  MILS maintained throughout  Mask difficulty assessment: 1 - vent by mask    Final Airway Details  Final airway type: supraglottic airway      Successful airway: classic  Size 4     Number of attempts at approach: 1  Number of other approaches attempted: 0    Additional Comments  LMA lubricated. Atraumatic insertion.

## 2023-12-28 ASSESSMENT — PAIN SCALES - GENERAL: PAINLEVEL_OUTOF10: 0 - NO PAIN

## 2024-01-09 DIAGNOSIS — G47.00 INSOMNIA, UNSPECIFIED TYPE: ICD-10-CM

## 2024-01-09 DIAGNOSIS — F32.9 MAJOR DEPRESSIVE DISORDER, REMISSION STATUS UNSPECIFIED, UNSPECIFIED WHETHER RECURRENT: ICD-10-CM

## 2024-01-09 RX ORDER — SERTRALINE HYDROCHLORIDE 50 MG/1
50 TABLET, FILM COATED ORAL DAILY
Qty: 90 TABLET | Refills: 0 | Status: SHIPPED | OUTPATIENT
Start: 2024-01-09 | End: 2024-03-17 | Stop reason: SDUPTHER

## 2024-01-09 RX ORDER — ZOLPIDEM TARTRATE 10 MG/1
10 TABLET ORAL NIGHTLY PRN
Qty: 30 TABLET | Refills: 0 | Status: SHIPPED | OUTPATIENT
Start: 2024-01-09 | End: 2024-02-28 | Stop reason: SDUPTHER

## 2024-01-11 ENCOUNTER — OFFICE VISIT (OUTPATIENT)
Dept: ORTHOPEDIC SURGERY | Facility: CLINIC | Age: 49
End: 2024-01-11
Payer: MEDICAID

## 2024-01-11 VITALS — WEIGHT: 193 LBS | BODY MASS INDEX: 34.2 KG/M2 | HEIGHT: 63 IN

## 2024-01-11 DIAGNOSIS — Z02.6 ENCOUNTER RELATED TO WORKER'S COMPENSATION CLAIM: ICD-10-CM

## 2024-01-11 DIAGNOSIS — M19.131 POST-TRAUMATIC ARTHRITIS OF RIGHT WRIST: Primary | ICD-10-CM

## 2024-01-11 PROCEDURE — 1036F TOBACCO NON-USER: CPT | Performed by: ORTHOPAEDIC SURGERY

## 2024-01-11 PROCEDURE — 99024 POSTOP FOLLOW-UP VISIT: CPT | Performed by: ORTHOPAEDIC SURGERY

## 2024-01-11 NOTE — PROGRESS NOTES
Date of surgery: 12/27/23         Surgery(s) performed: Right wrist arthrodesis         Patient presents today for her first post-operative visit, performed on 01/11/24. Overall she is doing well. She has some swelling and stiffness of her finger. Her pain is well controled and being managed by her physician of record.        Exam findings: mild swelling of wrist and hand. Healing surgical incisions, sensation is intact to light touch    Impression: Post traumatic arthritis    Plan: She was placed into a short arm fiberglass cast. Cast care was reviewed. She was encouraged to elevate her hand and work on digital range of motion. She will follow up in 4 weeks for a repeat clinical exam and new x-rays. At that time we will transition her to a removable sprint and refer her to therapy. Once she has recovered we can discuss left wrist proximal  row carpectomy.          Emilio Paz MD          [facility] [employer]     Department of Orthopaedic Surgery     Chief of Hand and Upper Extremity Surgery     [facility]      Scribe Attestation  By signing my name below, IJosi Scribe   attest that this documentation has been prepared under the direction and in the presence of Emilio Paz MD.

## 2024-02-06 ENCOUNTER — APPOINTMENT (OUTPATIENT)
Dept: BEHAVIORAL HEALTH | Facility: CLINIC | Age: 49
End: 2024-02-06
Payer: COMMERCIAL

## 2024-02-08 ENCOUNTER — APPOINTMENT (OUTPATIENT)
Dept: BEHAVIORAL HEALTH | Facility: CLINIC | Age: 49
End: 2024-02-08
Payer: COMMERCIAL

## 2024-02-08 ENCOUNTER — HOSPITAL ENCOUNTER (OUTPATIENT)
Dept: RADIOLOGY | Facility: CLINIC | Age: 49
Discharge: HOME | End: 2024-02-08
Payer: MEDICAID

## 2024-02-08 ENCOUNTER — OFFICE VISIT (OUTPATIENT)
Dept: ORTHOPEDIC SURGERY | Facility: CLINIC | Age: 49
End: 2024-02-08
Payer: MEDICAID

## 2024-02-08 VITALS — WEIGHT: 193 LBS | HEIGHT: 63 IN | BODY MASS INDEX: 34.2 KG/M2

## 2024-02-08 DIAGNOSIS — M19.131 POST-TRAUMATIC OSTEOARTHRITIS OF RIGHT WRIST: ICD-10-CM

## 2024-02-08 DIAGNOSIS — M19.131 POST-TRAUMATIC ARTHRITIS OF RIGHT WRIST: Primary | ICD-10-CM

## 2024-02-08 PROCEDURE — L3908 WHO COCK-UP NONMOLDE PRE OTS: HCPCS | Performed by: ORTHOPAEDIC SURGERY

## 2024-02-08 PROCEDURE — 73110 X-RAY EXAM OF WRIST: CPT | Mod: RT

## 2024-02-08 PROCEDURE — 99024 POSTOP FOLLOW-UP VISIT: CPT | Performed by: ORTHOPAEDIC SURGERY

## 2024-02-08 PROCEDURE — 1036F TOBACCO NON-USER: CPT | Performed by: ORTHOPAEDIC SURGERY

## 2024-02-08 PROCEDURE — 73110 X-RAY EXAM OF WRIST: CPT | Mod: RIGHT SIDE | Performed by: RADIOLOGY

## 2024-02-08 RX ORDER — FLUTICASONE PROPIONATE 50 MCG
2 SPRAY, SUSPENSION (ML) NASAL DAILY
COMMUNITY
End: 2024-06-02

## 2024-02-08 ASSESSMENT — PAIN - FUNCTIONAL ASSESSMENT: PAIN_FUNCTIONAL_ASSESSMENT: 0-10

## 2024-02-08 ASSESSMENT — PAIN SCALES - GENERAL: PAINLEVEL_OUTOF10: 5 - MODERATE PAIN

## 2024-02-08 NOTE — PROGRESS NOTES
Second post-operative visit since right wrist fusion. Date of surgery was 12/27/2023. Her pain is well controlled. She is struggling with swelling and motion.     Exam findings: Healed surgical incision. Dorsal prominence of ulnar head. Patient has a hard time supinating past neutral position. Unable to make full fist.    X-rays of right hand taken today demonstrate post-surgical changes with appropriate alignment with wrist fusion. However, on the lateral view there is dorsal subluxation of ulna relative to radius.     Impression: Wrist arthritis    Plan:   Placed into removable wrist brace today.  Referred to therapy. Therapist is to focus on finger and forearm range of motion to attempt to move into supinated position.  Will return in 4 weeks for repeat clinical examination and x-rays of right wrist.     Patient was prescribed a cock up wrist splint for arthritis. The patient has weakness, instability and/or deformity of their right wrist which requires stabilization from this orthosis to improve their function.      Verbal and written instructions for the use, wear schedule, cleaning and application of this item were given.  Patient was instructed that should the brace result in increased pain, decreased sensation, increased swelling, or an overall worsening of their medical condition, to please contact our office immediately.     Orthotic management and training was provided for skin care, modifications due to healing tissues, edema changes, interruption in skin integrity, and safety precautions with the orthosis.        Emilio Paz MD      [facility] [employer]   Department of Orthopaedic Surgery   Chief of Hand and Upper Extremity Surgery   [facility]     Scribe Attestation  By signing my name below, IJulianna Scribe attest that this documentation has been prepared under the direction and in the presence of Emilio  LALIT Paz MD.

## 2024-02-28 DIAGNOSIS — G47.00 INSOMNIA, UNSPECIFIED TYPE: ICD-10-CM

## 2024-02-29 RX ORDER — ZOLPIDEM TARTRATE 10 MG/1
10 TABLET ORAL NIGHTLY PRN
Qty: 30 TABLET | Refills: 0 | Status: SHIPPED | OUTPATIENT
Start: 2024-02-29 | End: 2024-03-17 | Stop reason: SDUPTHER

## 2024-03-04 ENCOUNTER — SOCIAL WORK (OUTPATIENT)
Dept: BEHAVIORAL HEALTH | Facility: CLINIC | Age: 49
End: 2024-03-04
Payer: MEDICAID

## 2024-03-04 DIAGNOSIS — F43.23 ADJUSTMENT DISORDER WITH MIXED ANXIETY AND DEPRESSED MOOD: ICD-10-CM

## 2024-03-04 PROCEDURE — 90832 PSYTX W PT 30 MINUTES: CPT

## 2024-03-04 NOTE — PROGRESS NOTES
Collaborative Care (CoCM)  Progress Note    Type of Interaction: Virtual    Start Time: 9:00am    End Time: 9:30am    Patient reports that her pain has not decreased since her surgery. Her doctor is concerned that she's not healing well and they may need to do another surgery. Patient has still not been approved for disability and has an upcoming hearing. It's a similar situation with her workmen's compensation claim. Patient Is frustrated that nothing has been resolved. Patient discussing that she recently filed for custody of her thirteen y/o grandchild. Patient has an upcoming court date with juvenile court. Patient states that the situation has added to her stress. Discussed with patient taking things one day at a time and focusing on the positive ascpets of her life. Patient would like to start exercising again. Her daughters dog has been staying at her house since December. Patient has found this very helpful to her mental health. Patient is planning on adopting a dog through the [facility]. Informed patient that this provider will be transitioning to another role at the end of this month.   Appointment: Scheduled      Interventions Provided: Solution Focused Therapy      Progress Made: Moderate    Response to Intervention: Receptive         Plan:   Discussed tying to focus on the positive aspects of her life and trying to in cooperate exercise into her routine. Patient plans on getting a dog for support.             Follow Up / Next Appointment:  March 12th & 21st

## 2024-03-12 ENCOUNTER — SOCIAL WORK (OUTPATIENT)
Dept: BEHAVIORAL HEALTH | Facility: CLINIC | Age: 49
End: 2024-03-12
Payer: MEDICAID

## 2024-03-12 ENCOUNTER — TELEMEDICINE (OUTPATIENT)
Dept: BEHAVIORAL HEALTH | Facility: CLINIC | Age: 49
End: 2024-03-12
Payer: MEDICAID

## 2024-03-12 DIAGNOSIS — F43.10 PTSD (POST-TRAUMATIC STRESS DISORDER): ICD-10-CM

## 2024-03-12 DIAGNOSIS — M19.131 POST-TRAUMATIC ARTHRITIS OF RIGHT WRIST: Primary | ICD-10-CM

## 2024-03-12 DIAGNOSIS — F43.23 ADJUSTMENT DISORDER WITH MIXED ANXIETY AND DEPRESSED MOOD: ICD-10-CM

## 2024-03-12 DIAGNOSIS — F32.9 MAJOR DEPRESSIVE DISORDER, REMISSION STATUS UNSPECIFIED, UNSPECIFIED WHETHER RECURRENT: ICD-10-CM

## 2024-03-12 DIAGNOSIS — G47.00 INSOMNIA, UNSPECIFIED TYPE: ICD-10-CM

## 2024-03-12 PROCEDURE — 90832 PSYTX W PT 30 MINUTES: CPT

## 2024-03-12 PROCEDURE — 99214 OFFICE O/P EST MOD 30 MIN: CPT | Performed by: PSYCHIATRY & NEUROLOGY

## 2024-03-12 PROCEDURE — 1036F TOBACCO NON-USER: CPT | Performed by: PSYCHIATRY & NEUROLOGY

## 2024-03-12 NOTE — PROGRESS NOTES
Collaborative Care (CoCM)  Progress Note    Type of Interaction: Virtual    Start Time: 8:00am    End Time: 8:30am    Patient states that her anxiety has increased this past week. Patient reports worrying about things that she can't control. She wakes up several times a night in pain. Her surgeon may have to remove the garrett that placed in her arm. Patient has an appointment this Thursday. Patient feels that her situation hasn't improved over the past four years. Patient has a hearing with social security office in . Patient reports a mentor and friend  last week. Patient states that her death was unexpected. Patient would like to continue therapy, will provide list of community providers.    Appointment: Scheduled        Interventions Provided: Solution Focused Therapy      Progress Made: Moderate    Response to Intervention: Receptive         Plan:   Will forward patient a list of community providers. Discussed option of contacting  Intake department to be scheduled with another provider.       Follow Up / Next Appointment:   at 8am

## 2024-03-12 NOTE — PROGRESS NOTES
Subjective   Patient ID: Rosanna Mckeon is a 48 y.o. female.    HPI  Pt is treated for:  1. Major depressive disorder, remission status unspecified, unspecified whether recurrent  Follow Up In Psychiatry    sertraline (Zoloft) 50 mg tablet      2. Insomnia, unspecified type  zolpidem (Ambien) 10 mg tablet      3. PTSD (post-traumatic stress disorder) [F43.10]        Last visit 5/2023, briefly pt reports recent loss of mentor, financial stress and chronic medical illness (s/p R wrist surgery)  Continue to have some help with meal prep and general care from  Children, aurelio dtr;  Additional stress with son's mental mary;        Review of Systems   Psychiatric/Behavioral:  Positive for dysphoric mood and sleep disturbance.        Objective   Physical Exam  Psychiatric:         Speech: Speech normal.         Behavior: Behavior is cooperative.         Thought Content: Thought content does not include homicidal or suicidal ideation.      Comments: Mood/affect:  Low due to   Multiple stressors   Without associated  SI/HI;         Assessment/Plan   Diagnoses and all orders for this visit:  Major depressive disorder, remission status unspecified, unspecified whether recurrent  -     Follow Up In Psychiatry; Future  -     sertraline (Zoloft) 50 mg tablet; Take 1 tablet (50 mg) by mouth once daily.  Insomnia, unspecified type  -     zolpidem (Ambien) 10 mg tablet; Take 1 tablet (10 mg) by mouth as needed at bedtime for sleep.  PTSD (post-traumatic stress disorder) [F43.10]      Current Outpatient Medications:     albuterol 2.5 mg /3 mL (0.083 %) nebulizer solution, Take 3 mL (2.5 mg) by nebulization. Every 4 to 6 hours as needed for wheezing, Disp: , Rfl:     albuterol 90 mcg/actuation inhaler, 2 puffs every 6 hours if needed., Disp: , Rfl:     amLODIPine (Norvasc) 2.5 mg tablet, Take 1 tablet (2.5 mg) by mouth once daily., Disp: , Rfl:     ascorbic acid (Vitamin C) 500 mg tablet, Take 2 tablets (1,000 mg) by mouth once daily.  chewable, Disp: , Rfl:     atenoloL-chlorthalidone (Tenoretic) 50-25 mg tablet, Take 1 tablet by mouth once daily., Disp: , Rfl:     atorvastatin (Lipitor) 20 mg tablet, Take 1 tablet (20 mg) by mouth once daily at bedtime., Disp: , Rfl:     B complex-vitamin C-folic acid (Nephro-Altagracia Rx) 1- mg-mg-mcg tablet, Take 1 tablet by mouth once daily with breakfast., Disp: , Rfl:     cetirizine (ZyrTEC) 10 mg capsule, 1 capsule (10 mg) once daily., Disp: , Rfl:     docusate sodium (Colace) 100 mg capsule, Take 1 capsule (100 mg) by mouth once daily as needed., Disp: , Rfl:     doxepin (Silenor) 6 mg tablet, Take 1 tablet (6 mg) by mouth as needed at bedtime (insomnia)., Disp: , Rfl:     fluticasone (Flonase) 50 mcg/actuation nasal spray, Administer 2 sprays into each nostril once daily., Disp: , Rfl:     ketotifen (Zaditor) 0.025 % (0.035 %) ophthalmic solution, Administer 1 drop into both eyes 2 times a day as needed., Disp: , Rfl:     oxyCODONE-acetaminophen (Percocet) 5-325 mg tablet, Take 1 tablet by mouth 2 times a day as needed (pain)., Disp: , Rfl:     sertraline (Zoloft) 50 mg tablet, Take 1 tablet (50 mg) by mouth once daily., Disp: 90 tablet, Rfl: 0    triamcinolone (Kenalog) 0.5 % cream, Apply topically once daily as needed., Disp: , Rfl:     valACYclovir (Valtrex) 1 gram tablet, Take 1 tablet (1,000 mg) by mouth 2 times a day as needed., Disp: , Rfl:     vitamin E 180 mg (400 unit) capsule, Take 1 capsule (400 Units) by mouth once daily., Disp: , Rfl:     zolpidem (Ambien) 10 mg tablet, Take 1 tablet (10 mg) by mouth as needed at bedtime for sleep., Disp: 30 tablet, Rfl: 0  Patient Active Problem List   Diagnosis    Chronic patellofemoral pain of left knee    Major depression    Insomnia    Pain    Post-traumatic arthritis of right wrist    Right hand tendonitis    Anxiety    Grief    Left wrist pain    Post traumatic stress disorder    Scapholunate advanced collapse of left wrist    Posttraumatic  arthritis of wrist, right     OARRS reviewed    ASSESSMENT AND PLAN  Keep next scheduled follow up visit;  ---after business hours and on weekends: call [phone] to reach the answering service  ---for appointments and medication refills and any questions or concerns call [phone]  ---if you run out of your medication or need more refills between visits, call our office: [phone]  ---if you need immediate assistance or in case of emergency, dial 911 or go to the nearest emergency room   ---discussed Risks, benefits, side effects and alternative treatments today and came up with a treatment plan       that pt agreed upon today.

## 2024-03-14 ENCOUNTER — OFFICE VISIT (OUTPATIENT)
Dept: ORTHOPEDIC SURGERY | Facility: CLINIC | Age: 49
End: 2024-03-14
Payer: COMMERCIAL

## 2024-03-14 ENCOUNTER — HOSPITAL ENCOUNTER (OUTPATIENT)
Dept: RADIOLOGY | Facility: CLINIC | Age: 49
Discharge: HOME | End: 2024-03-14
Payer: COMMERCIAL

## 2024-03-14 VITALS — BODY MASS INDEX: 34.2 KG/M2 | HEIGHT: 63 IN | WEIGHT: 193 LBS

## 2024-03-14 DIAGNOSIS — M19.131 POST-TRAUMATIC OSTEOARTHRITIS OF RIGHT WRIST: Primary | ICD-10-CM

## 2024-03-14 DIAGNOSIS — M19.131 POST-TRAUMATIC ARTHRITIS OF RIGHT WRIST: ICD-10-CM

## 2024-03-14 PROCEDURE — 73110 X-RAY EXAM OF WRIST: CPT | Mod: RIGHT SIDE | Performed by: RADIOLOGY

## 2024-03-14 PROCEDURE — 73110 X-RAY EXAM OF WRIST: CPT | Mod: RT

## 2024-03-14 PROCEDURE — 99024 POSTOP FOLLOW-UP VISIT: CPT | Performed by: ORTHOPAEDIC SURGERY

## 2024-03-14 ASSESSMENT — PAIN SCALES - GENERAL: PAINLEVEL_OUTOF10: 5 - MODERATE PAIN

## 2024-03-14 ASSESSMENT — PAIN - FUNCTIONAL ASSESSMENT: PAIN_FUNCTIONAL_ASSESSMENT: 0-10

## 2024-03-14 ASSESSMENT — PAIN DESCRIPTION - DESCRIPTORS: DESCRIPTORS: ACHING

## 2024-03-14 NOTE — PROGRESS NOTES
Surgery(s) performed: right wrist fusion on 12/27/2023    Patient presents for third post-operative visit since right wrist fusion on 12/27/2023.  She indicated issues with [facility] approval. She recently was approved to do therapy through physician records office. There still is right wrist pain and stiffness.      Exam findings: healed dorsal surgical incision. Slight dorsal prominence of ulnar head. Actively supinate to 60-75 degrees. Tenderness around ulnar head. Full finger extension, near full finger flexion.      X-rays of right wrist taken today demonstrate solid radiolunate fusion. Some slight dorsal subluxation of ulna relative to radius on lateral view, but much improved from last x-ray a month ago.      Impression:   Right wrist arthritis    Plan:   Stressed importance of formal hand therapy after surgery.   Patient will talk with physician on record to determine if there is a hand therapist in office. If not, she will follow up with hand therapist she previously saw for past surgery.  Will return in 5 weeks for x-rays. If right wrist is doing well can discuss treatment options for left at that time.        Emilio Paz MD      [facility]   Department of Orthopaedic Surgery   Chief of Hand and Upper Extremity Surgery   [facility]     Scribe Attestation  By signing my name below, IJulianna Scribe attest that this documentation has been prepared under the direction and in the presence of Emilio Paz MD.

## 2024-03-17 RX ORDER — ZOLPIDEM TARTRATE 10 MG/1
10 TABLET ORAL NIGHTLY PRN
Qty: 30 TABLET | Refills: 0 | Status: SHIPPED | OUTPATIENT
Start: 2024-03-17 | End: 2024-05-07 | Stop reason: SDUPTHER

## 2024-03-17 RX ORDER — SERTRALINE HYDROCHLORIDE 50 MG/1
50 TABLET, FILM COATED ORAL DAILY
Qty: 90 TABLET | Refills: 0 | Status: SHIPPED | OUTPATIENT
Start: 2024-03-17 | End: 2024-06-02 | Stop reason: SDUPTHER

## 2024-03-17 ASSESSMENT — ENCOUNTER SYMPTOMS
SLEEP DISTURBANCE: 1
DYSPHORIC MOOD: 1

## 2024-03-21 ENCOUNTER — SOCIAL WORK (OUTPATIENT)
Dept: BEHAVIORAL HEALTH | Facility: CLINIC | Age: 49
End: 2024-03-21
Payer: MEDICAID

## 2024-03-21 DIAGNOSIS — F32.2 CURRENT SEVERE EPISODE OF MAJOR DEPRESSIVE DISORDER WITHOUT PSYCHOTIC FEATURES, UNSPECIFIED WHETHER RECURRENT (MULTI): ICD-10-CM

## 2024-03-21 PROCEDURE — 90832 PSYTX W PT 30 MINUTES: CPT

## 2024-03-21 NOTE — PROGRESS NOTES
Collaborative Care (CoCM)  Progress Note    Type of Interaction: Virtual    Start Time: 8:00am    End Time: 8:30am    Patient states having an argument with her children this past week. The argument centered around the situation with her grand daughter. Patient reports that she doesn't have the energy to focus on this. Patient reports her finances are primary stressor. Patient social security and workman's compensation claim are still on going. Patient reports that she has been trying to get out bed more and shower. Patient had only been showering once every two weeks.  Discussed option of going back to [facility]. Patient reports that she felt happy when she doing the groups. However, after she finished it was back to her life. Patient will talk to her prescriber about this.     Appointment: Not Scheduled        Interventions Provided: Solution Focused Therapy      Progress Made: Moderate    Response to Intervention: Receptive         Plan:   Spoke with patient continuing care with a community provider. Discussed option of contacting  scheduling to see if any providers have openings. Encouraged patient to consider [facility] IOP.          Follow Up / Next Appointment:  Patient encouraged to continue care with another provider

## 2024-03-26 DIAGNOSIS — M19.131 POST-TRAUMATIC ARTHRITIS OF RIGHT WRIST: Primary | ICD-10-CM

## 2024-04-24 DIAGNOSIS — M19.131 POST-TRAUMATIC OSTEOARTHRITIS OF RIGHT WRIST: Primary | ICD-10-CM

## 2024-04-25 ENCOUNTER — HOSPITAL ENCOUNTER (OUTPATIENT)
Dept: RADIOLOGY | Facility: CLINIC | Age: 49
Discharge: HOME | End: 2024-04-25
Payer: COMMERCIAL

## 2024-04-25 ENCOUNTER — OFFICE VISIT (OUTPATIENT)
Dept: ORTHOPEDIC SURGERY | Facility: CLINIC | Age: 49
End: 2024-04-25
Payer: COMMERCIAL

## 2024-04-25 VITALS — BODY MASS INDEX: 35.51 KG/M2 | HEIGHT: 62 IN | WEIGHT: 193 LBS

## 2024-04-25 DIAGNOSIS — M19.131 POST-TRAUMATIC OSTEOARTHRITIS OF RIGHT WRIST: ICD-10-CM

## 2024-04-25 DIAGNOSIS — M19.131 POST-TRAUMATIC OSTEOARTHRITIS OF RIGHT WRIST: Primary | ICD-10-CM

## 2024-04-25 PROCEDURE — 73100 X-RAY EXAM OF WRIST: CPT | Mod: RIGHT SIDE | Performed by: STUDENT IN AN ORGANIZED HEALTH CARE EDUCATION/TRAINING PROGRAM

## 2024-04-25 PROCEDURE — 99213 OFFICE O/P EST LOW 20 MIN: CPT | Performed by: ORTHOPAEDIC SURGERY

## 2024-04-25 PROCEDURE — 73100 X-RAY EXAM OF WRIST: CPT | Mod: RT

## 2024-04-25 ASSESSMENT — PAIN - FUNCTIONAL ASSESSMENT: PAIN_FUNCTIONAL_ASSESSMENT: NO/DENIES PAIN

## 2024-04-25 NOTE — LETTER
April 25, 2024     Indra Altamirano MD  [address] [address]  [address]  [place] [address]    Patient: Rosanna Mckeon   YOB: 1975   Date of Visit: 4/25/2024       Dear Dr. Indra Altamirano MD:    Thank you for referring Rosanna Mckeon to me for evaluation. Below are my notes for this consultation.  If you have questions, please do not hesitate to call me. I look forward to following your patient along with you.       Sincerely,     Emilio Paz MD      CC: No Recipients  ______________________________________________________________________________________    Patient returns to clinic today for a follow up after a right wrist fusion on 12/27. She just recently started some strengthening activities and therapy. Patient is still fairly impaired with the use of her right hand for normal everyday activities because of pain and stiffness. She's concerned because her employer is sending her for an independent medical exam to determine if she has met maximum medical improvement. Her  has recommended that I put  a C9 in for her to see Dr. Edgar Edge as a second opinion to refute any claims of MI at this point.Her existing physician of record was apparently unhappy that I wanted her to do occupational as opposed to physical therapy following the surgery and now is refusing to fill out her MEDCO-14 and so she is in need of a new but physician of record for her industrial claim.    Past medical history, medications, allergies, surgical history and review of systems are reviewed and otherwise unchanged when compared to last visit on 3/14/24.     Examination:   Constitutional: Oriented to person, place, and time.   Appears well-developed and well-nourished.   Head: Normocephalic and atraumatic.   Eyes: Pupils are equal, round, and reactive to light.   Cardiovascular: Intact distal pulses.   Pulmonary/Chest/Breast: Effort normal. No respiratory distress.   Neurological: Alert and oriented to  person, place, and time.   Skin: Skin is warm and dry.   Psychiatric: normal mood and affect. Behavior is normal.   Musculoskeletal: Examination today reveals her incision is well healed. Her distal radioulnar joint is stable. She has perhaps 80 degrees each of pronation and supination. Full digital flexion and extension. Her sensation is intact to light touch.       X-rays of the right wrist taken today reveal solid fusion of the radiolunate joint with a concentric appearing distal radioulnar joint.       Impression: Right wrist arthritis    Plan: I think she should now progress with strengthening and use of the hand around the house as tolerated. It is far too early to make any determination of maximum medical improvement as she still has a lot of therapy to go through. We will submit a C9 requesting consultation with Dr. Edgar Edge for the purposes of refuting any claims of MI at her request. I've also given her contact information to schedule an appointment with Dr. Indra Altamirano so she can find a new physician of record for her industrial claims since her existing one does not seem interested in helping her any further. Follow up with me in six weeks, no X rays needed,       Emilio Paz MD      [facility] [employer]   Department of Orthopaedic Surgery   Chief of Hand and Upper Extremity Surgery   [facility]       Scribe Attestation  By signing my name below, I, Bishop Garzon, attest that this documentation has been prepared under the direction and in the presence of Emilio Paz MD.

## 2024-04-25 NOTE — PROGRESS NOTES
Patient returns to clinic today for a follow up after a right wrist fusion on 12/27. She just recently started some strengthening activities and therapy. Patient is still fairly impaired with the use of her right hand for normal everyday activities because of pain and stiffness. She's concerned because her employer is sending her for an independent medical exam to determine if she has met maximum medical improvement. Her  has recommended that I put  a C9 in for her to see Dr. Edgar Edge as a second opinion to refute any claims of MI at this point.Her existing physician of record was apparently unhappy that I wanted her to do occupational as opposed to physical therapy following the surgery and now is refusing to fill out her MEDCO-14 and so she is in need of a new but physician of record for her industrial claim.    Past medical history, medications, allergies, surgical history and review of systems are reviewed and otherwise unchanged when compared to last visit on 3/14/24.     Examination:   Constitutional: Oriented to person, place, and time.   Appears well-developed and well-nourished.   Head: Normocephalic and atraumatic.   Eyes: Pupils are equal, round, and reactive to light.   Cardiovascular: Intact distal pulses.   Pulmonary/Chest/Breast: Effort normal. No respiratory distress.   Neurological: Alert and oriented to person, place, and time.   Skin: Skin is warm and dry.   Psychiatric: normal mood and affect. Behavior is normal.   Musculoskeletal: Examination today reveals her incision is well healed. Her distal radioulnar joint is stable. She has perhaps 80 degrees each of pronation and supination. Full digital flexion and extension. Her sensation is intact to light touch.       X-rays of the right wrist taken today reveal solid fusion of the radiolunate joint with a concentric appearing distal radioulnar joint.       Impression: Right wrist arthritis    Plan: I think she should now progress  with strengthening and use of the hand around the house as tolerated. It is far too early to make any determination of maximum medical improvement as she still has a lot of therapy to go through. We will submit a C9 requesting consultation with Dr. Gregorio Edge for the purposes of refuting any claims of MI at her request. I've also given her contact information to schedule an appointment with Dr. Indra Altamirano so she can find a new physician of record for her industrial claims since her existing one does not seem interested in helping her any further. Follow up with me in six weeks, no X rays needed,       Emilio Paz MD      [facility] [employer]   Department of Orthopaedic Surgery   Chief of Hand and Upper Extremity Surgery   [facility]       Scribe Attestation  By signing my name below, I, Bishop Garzon, attest that this documentation has been prepared under the direction and in the presence of Emilio Paz MD.

## 2024-05-07 ENCOUNTER — TELEMEDICINE (OUTPATIENT)
Dept: BEHAVIORAL HEALTH | Facility: CLINIC | Age: 49
End: 2024-05-07
Payer: MEDICAID

## 2024-05-07 DIAGNOSIS — F32.9 MAJOR DEPRESSIVE DISORDER, REMISSION STATUS UNSPECIFIED, UNSPECIFIED WHETHER RECURRENT: ICD-10-CM

## 2024-05-07 DIAGNOSIS — G47.00 INSOMNIA, UNSPECIFIED TYPE: ICD-10-CM

## 2024-05-07 PROCEDURE — 99214 OFFICE O/P EST MOD 30 MIN: CPT | Performed by: PSYCHIATRY & NEUROLOGY

## 2024-05-07 RX ORDER — ZOLPIDEM TARTRATE 10 MG/1
10 TABLET ORAL NIGHTLY PRN
Qty: 30 TABLET | Refills: 3 | Status: SHIPPED | OUTPATIENT
Start: 2024-05-07

## 2024-05-07 RX ORDER — DOXEPIN 6 MG/1
6 TABLET, FILM COATED ORAL NIGHTLY PRN
Qty: 30 TABLET | Refills: 3 | Status: SHIPPED | OUTPATIENT
Start: 2024-05-07

## 2024-05-07 NOTE — PROGRESS NOTES
Subjective   Patient ID: Rosanna Mckeon is a 48 y.o. female.    HPI  Subjective   Rosanna Mckeon is a 48 y.o. female who presents for evaluation and treatment of depressive symptoms.   Current symptoms include depressed mood, anhedonia, insomnia, psychomotor retardation, fatigue, feelings of worthlessness/guilt, difficulty concentrating, and anxiety.   Current treatment for depression: Medication  Sleep problems: Marked    Early awakening: Absent    Energy: Poor  Motivation: Poor  Concentration: Limited  Rumination/worrying: Moderate  Memory: Limited  Tearfulness: Moderate   Anxiety: Moderate   Panic: Moderate   Overall Mood: No change   Hopelessness: Absent  Suicidal ideation: Marked   Other/Psychosocial Stressors: medical, relationship, financial                1. Major depressive disorder, remission status unspecified, unspecified whether recurrent  Follow Up In Psychiatry    doxepin (Silenor) 6 mg tablet    Referral to Social Work    Follow Up In Psychiatry    sertraline (Zoloft) 50 mg tablet      2. Insomnia, unspecified type  zolpidem (Ambien) 10 mg tablet    doxepin (Silenor) 6 mg tablet    Follow Up In Psychiatry              Review of Systems   Psychiatric/Behavioral:  Positive for dysphoric mood and sleep disturbance.        Objective   Physical Exam  Psychiatric:         Speech: Speech normal.         Behavior: Behavior is cooperative.         Thought Content: Thought content does not include homicidal or suicidal ideation.      Comments: Mood/affect:  Low due to   Multiple stressors   Without associated  SI/HI;         Assessment/Plan   Diagnoses and all orders for this visit:  Major depressive disorder, remission status unspecified, unspecified whether recurrent  -     Follow Up In Psychiatry  -     doxepin (Silenor) 6 mg tablet; Take 1 tablet (6 mg) by mouth as needed at bedtime (insomnia).  -     Referral to Social Work; Future  -     Follow Up In Psychiatry; Future  Insomnia, unspecified type  -      zolpidem (Ambien) 10 mg tablet; Take 1 tablet (10 mg) by mouth as needed at bedtime for sleep.  -     doxepin (Silenor) 6 mg tablet; Take 1 tablet (6 mg) by mouth as needed at bedtime (insomnia).  -     Follow Up In Psychiatry; Future

## 2024-06-02 RX ORDER — SERTRALINE HYDROCHLORIDE 50 MG/1
50 TABLET, FILM COATED ORAL DAILY
Qty: 90 TABLET | Refills: 0 | Status: SHIPPED | OUTPATIENT
Start: 2024-06-02

## 2024-06-02 ASSESSMENT — ENCOUNTER SYMPTOMS
SLEEP DISTURBANCE: 1
DYSPHORIC MOOD: 1

## 2024-06-03 NOTE — PROGRESS NOTES
[facility]  Hand and Upper Extremity Service  Follow up visit         Follow up for: Right wrist     Interval History:               Past medical history, medications, allergies, surgical history and review of systems are reviewed and otherwise unchanged when compared to last visit on 3/14/24         Examination:  Constitutional: Oriented to person, place, and time.  Appears well-developed and well-nourished.  Head: Normocephalic and atraumatic.  Eyes: Pupils are equal, round, and reactive to light.  Cardiovascular: Intact distal pulses.  Pulmonary/Chest/Breast: Effort normal. No respiratory distress.  Neurological: Alert and oriented to person, place, and time.  Skin: Skin is warm and dry.  Psychiatric: normal mood and affect.  Behavior is normal.  Musculoskeletal: Right wrist reveals       Personal Interpretation of Diagnostic studies:        Impression: Right wrist arthritis       Plan:       In Office Procedures Performed:       Medications Prescribed:          Follow up:              Emilio Paz MD  [facility]  Department of Orthopaedic Surgery  Hand and Upper Extremity Reconstruction    Scribe Attestation  By signing my name below, IMary Beth , Scribe   attest that this documentation has been prepared under the direction and in the presence of Dr. Emilio Paz.    Dictation performed with the use of voice recognition software.  Syntax and grammatical errors may exist.

## 2024-06-05 DIAGNOSIS — M19.131 POST-TRAUMATIC OSTEOARTHRITIS OF RIGHT WRIST: Primary | ICD-10-CM

## 2024-06-06 ENCOUNTER — APPOINTMENT (OUTPATIENT)
Dept: RADIOLOGY | Facility: CLINIC | Age: 49
End: 2024-06-06
Payer: MEDICAID

## 2024-06-06 ENCOUNTER — APPOINTMENT (OUTPATIENT)
Dept: ORTHOPEDIC SURGERY | Facility: CLINIC | Age: 49
End: 2024-06-06
Payer: MEDICAID

## 2024-06-11 ENCOUNTER — OFFICE VISIT (OUTPATIENT)
Dept: ORTHOPEDIC SURGERY | Facility: HOSPITAL | Age: 49
End: 2024-06-11
Payer: COMMERCIAL

## 2024-06-11 ENCOUNTER — HOSPITAL ENCOUNTER (OUTPATIENT)
Dept: RADIOLOGY | Facility: HOSPITAL | Age: 49
Discharge: HOME | End: 2024-06-11
Payer: COMMERCIAL

## 2024-06-11 VITALS — HEIGHT: 62 IN | WEIGHT: 193 LBS | BODY MASS INDEX: 35.51 KG/M2

## 2024-06-11 DIAGNOSIS — M19.131 POST-TRAUMATIC OSTEOARTHRITIS OF RIGHT WRIST: Primary | ICD-10-CM

## 2024-06-11 DIAGNOSIS — M19.131 POST-TRAUMATIC OSTEOARTHRITIS OF RIGHT WRIST: ICD-10-CM

## 2024-06-11 PROCEDURE — 99213 OFFICE O/P EST LOW 20 MIN: CPT | Performed by: ORTHOPAEDIC SURGERY

## 2024-06-11 PROCEDURE — 73100 X-RAY EXAM OF WRIST: CPT | Mod: RT

## 2024-06-11 ASSESSMENT — PAIN SCALES - GENERAL: PAINLEVEL_OUTOF10: 5 - MODERATE PAIN

## 2024-06-11 ASSESSMENT — PAIN DESCRIPTION - DESCRIPTORS: DESCRIPTORS: ACHING;SORE

## 2024-06-11 ASSESSMENT — PAIN - FUNCTIONAL ASSESSMENT: PAIN_FUNCTIONAL_ASSESSMENT: 0-10

## 2024-06-11 NOTE — LETTER
June 11, 2024     Indra Altamirano MD  [address] [address]  [address]  [place] [address]    Patient: Rosanna Mckeon   YOB: 1975   Date of Visit: 6/11/2024       Dear Dr. Indra Altamirano MD:    Thank you for referring Rosanna Mckeon to me for evaluation. Below are my notes for this consultation.  If you have questions, please do not hesitate to call me. I look forward to following your patient along with you.       Sincerely,     Emilio Paz MD      CC: No Recipients  ______________________________________________________________________________________    [facility]  Hand and Upper Extremity Service  Follow up visit         Follow up for: Bilateral wrist     Interval History: Underwent right wrist arthrodesis on 12/27/23. She's continuing to improve with slight weakness and discomfort. Her therapist that she has been working with has been on medical leave so she would like a C9 for therapy extension as they continue to find a new therapist for her to work with in the meantime. She reports that she's seen Dr. Altamirano which has taken over as her physician of record. She had an appointment last week but had to cancel as she had an acute hypertension issue that she's working on getting under control. She still has untreated left wrist issue and an  working with her to get her set up for an industrial claim. Once she's fully recovered from the right wrist, the left side can be addressed.               Past medical history, medications, allergies, surgical history and review of systems are reviewed and otherwise unchanged when compared to last visit on 3/14/24         Examination:  Constitutional: Oriented to person, place, and time.  Appears well-developed and well-nourished.  Head: Normocephalic and atraumatic.  Eyes: Pupils are equal, round, and reactive to light.  Cardiovascular: Intact distal pulses.  Pulmonary/Chest/Breast: Effort normal. No  respiratory distress.  Neurological: Alert and oriented to person, place, and time.  Skin: Skin is warm and dry.  Psychiatric: normal mood and affect.  Behavior is normal.  Musculoskeletal: Right hand reveals healed surgical incision dorsally. Full forearm rotation. Full digital range of motion. Still persistent tenderness dorsally across the wrist joint. Sensation intact to light touch. Left wrist reveals no swelling but tenderness to palpation dorsally over the wrist joint. Mild loss of terminal flexion and extension of the wrist. Pain with terminal wrist extension.       Personal Interpretation of Diagnostic studies: Xrays of right wrist obtained today demonstrates healed right wrist arthrodesis.        Impression: Bilateral wrist arthritis      Plan: We will submit a C9 for therapy extension so she can continue to recover from right wrist surgery. When she has fully recovered and is able to rely on her right hand, we will address the left side which will likely be treated with proximal row carpectomy. She needs to get her blood pressure issues resolved before we get started with surgery on the left side.       Follow up: 2 months, no xrays needed              Emilio Paz MD  [facility]  Department of Orthopaedic Surgery  Hand and Upper Extremity Reconstruction    Scribe Attestation  By signing my name below, I, Mary Beth Raphael , Scribkojo   attest that this documentation has been prepared under the direction and in the presence of Dr. Emilio Paz.    Dictation performed with the use of voice recognition software.  Syntax and grammatical errors may exist.

## 2024-06-11 NOTE — PROGRESS NOTES
[facility]  Hand and Upper Extremity Service  Follow up visit         Follow up for: Bilateral wrist     Interval History: Underwent right wrist arthrodesis on 12/27/23. She's continuing to improve with slight weakness and discomfort. Her therapist that she has been working with has been on medical leave so she would like a C9 for therapy extension as they continue to find a new therapist for her to work with in the meantime. She reports that she's seen Dr. Altamirano which has taken over as her physician of record. She had an appointment last week but had to cancel as she had an acute hypertension issue that she's working on getting under control. She still has untreated left wrist issue and an  working with her to get her set up for an industrial claim. Once she's fully recovered from the right wrist, the left side can be addressed.               Past medical history, medications, allergies, surgical history and review of systems are reviewed and otherwise unchanged when compared to last visit on 3/14/24         Examination:  Constitutional: Oriented to person, place, and time.  Appears well-developed and well-nourished.  Head: Normocephalic and atraumatic.  Eyes: Pupils are equal, round, and reactive to light.  Cardiovascular: Intact distal pulses.  Pulmonary/Chest/Breast: Effort normal. No respiratory distress.  Neurological: Alert and oriented to person, place, and time.  Skin: Skin is warm and dry.  Psychiatric: normal mood and affect.  Behavior is normal.  Musculoskeletal: Right hand reveals healed surgical incision dorsally. Full forearm rotation. Full digital range of motion. Still persistent tenderness dorsally across the wrist joint. Sensation intact to light touch. Left wrist reveals no swelling but tenderness to palpation dorsally over the wrist joint. Mild loss of terminal flexion and extension of the wrist. Pain with terminal wrist extension.       Personal  Interpretation of Diagnostic studies: Xrays of right wrist obtained today demonstrates healed right wrist arthrodesis.        Impression: Bilateral wrist arthritis      Plan: We will submit a C9 for therapy extension so she can continue to recover from right wrist surgery. When she has fully recovered and is able to rely on her right hand, we will address the left side which will likely be treated with proximal row carpectomy. She needs to get her blood pressure issues resolved before we get started with surgery on the left side.       Follow up: 2 months, no xrays needed              Emilio Paz MD  [facility]  Department of Orthopaedic Surgery  Hand and Upper Extremity Reconstruction    Scribe Attestation  By signing my name below, I Sarahmarie Lim , Jennieibkojo   attest that this documentation has been prepared under the direction and in the presence of Dr. Emilio Paz.    Dictation performed with the use of voice recognition software.  Syntax and grammatical errors may exist.

## 2024-08-06 ENCOUNTER — APPOINTMENT (OUTPATIENT)
Dept: BEHAVIORAL HEALTH | Facility: CLINIC | Age: 49
End: 2024-08-06
Payer: COMMERCIAL

## 2024-08-06 DIAGNOSIS — G47.00 INSOMNIA, UNSPECIFIED TYPE: ICD-10-CM

## 2024-08-06 DIAGNOSIS — F32.9 MAJOR DEPRESSIVE DISORDER, REMISSION STATUS UNSPECIFIED, UNSPECIFIED WHETHER RECURRENT: ICD-10-CM

## 2024-08-06 DIAGNOSIS — F32.9 MAJOR DEPRESSIVE DISORDER WITH CURRENT ACTIVE EPISODE, UNSPECIFIED DEPRESSION EPISODE SEVERITY, UNSPECIFIED WHETHER RECURRENT: ICD-10-CM
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
## 2024-08-08 RX ORDER — DOXEPIN 6 MG/1
6 TABLET, FILM COATED ORAL NIGHTLY PRN
Qty: 30 TABLET | Refills: 2 | Status: SHIPPED | OUTPATIENT
Start: 2024-08-08

## 2024-08-08 RX ORDER — ZOLPIDEM TARTRATE 10 MG/1
10 TABLET ORAL NIGHTLY PRN
Qty: 30 TABLET | Refills: 2 | Status: SHIPPED | OUTPATIENT
Start: 2024-08-08

## 2024-08-08 RX ORDER — SERTRALINE HYDROCHLORIDE 100 MG/1
100 TABLET, FILM COATED ORAL DAILY
Qty: 90 TABLET | Refills: 1 | Status: SHIPPED | OUTPATIENT
Start: 2024-08-08

## 2024-08-08 ASSESSMENT — ENCOUNTER SYMPTOMS
NERVOUS/ANXIOUS: 1
HALLUCINATIONS: 0

## 2024-08-23 ENCOUNTER — PATIENT MESSAGE (OUTPATIENT)
Dept: ORTHOPEDIC SURGERY | Facility: HOSPITAL | Age: 49
End: 2024-08-23
Payer: MEDICAID

## 2024-09-03 ENCOUNTER — OFFICE VISIT (OUTPATIENT)
Dept: ORTHOPEDIC SURGERY | Facility: HOSPITAL | Age: 49
End: 2024-09-03
Payer: COMMERCIAL

## 2024-09-03 ENCOUNTER — APPOINTMENT (OUTPATIENT)
Dept: BEHAVIORAL HEALTH | Facility: CLINIC | Age: 49
End: 2024-09-03
Payer: COMMERCIAL

## 2024-09-03 VITALS — BODY MASS INDEX: 35.51 KG/M2 | HEIGHT: 62 IN | WEIGHT: 193 LBS

## 2024-09-03 DIAGNOSIS — M19.132 SLAC (SCAPHOLUNATE ADVANCED COLLAPSE) OF WRIST, LEFT: ICD-10-CM

## 2024-09-03 DIAGNOSIS — F32.9 MAJOR DEPRESSIVE DISORDER, REMISSION STATUS UNSPECIFIED, UNSPECIFIED WHETHER RECURRENT: ICD-10-CM

## 2024-09-03 DIAGNOSIS — G47.00 INSOMNIA, UNSPECIFIED TYPE: ICD-10-CM

## 2024-09-03 DIAGNOSIS — Z02.6 ENCOUNTER RELATED TO WORKER'S COMPENSATION CLAIM: ICD-10-CM

## 2024-09-03 DIAGNOSIS — M19.131 POST-TRAUMATIC OSTEOARTHRITIS OF RIGHT WRIST: Primary | ICD-10-CM

## 2024-09-03 PROCEDURE — 99214 OFFICE O/P EST MOD 30 MIN: CPT | Performed by: ORTHOPAEDIC SURGERY

## 2024-09-03 RX ORDER — DOXEPIN 6 MG/1
6 TABLET, FILM COATED ORAL NIGHTLY PRN
Qty: 30 TABLET | Refills: 3 | Status: SHIPPED | OUTPATIENT
Start: 2024-09-03

## 2024-09-03 RX ORDER — ZOLPIDEM TARTRATE 10 MG/1
10 TABLET ORAL NIGHTLY PRN
Qty: 30 TABLET | Refills: 3 | Status: SHIPPED | OUTPATIENT
Start: 2024-09-03

## 2024-09-03 ASSESSMENT — ENCOUNTER SYMPTOMS
HOPELESSNESS: 0
PANIC: 1
DIFFICULTY WITH CONCENTRATION: 1

## 2024-09-03 ASSESSMENT — PAIN DESCRIPTION - DESCRIPTORS: DESCRIPTORS: ACHING;SORE;NUMBNESS

## 2024-09-03 ASSESSMENT — PAIN SCALES - GENERAL: PAINLEVEL_OUTOF10: 5 - MODERATE PAIN

## 2024-09-03 ASSESSMENT — PAIN - FUNCTIONAL ASSESSMENT: PAIN_FUNCTIONAL_ASSESSMENT: 0-10

## 2024-09-03 NOTE — LETTER
September 3, 2024     Kendal Latham, APRN-CNP  [address] [facility] [address]    Patient: Rosanna Mckeon   YOB: 1975   Date of Visit: 9/3/2024       Dear Dr. Kendal Latham, APRN-CNP:    Thank you for referring Rosanna Mckeon to me for evaluation. Below are my notes for this consultation.  If you have questions, please do not hesitate to call me. I look forward to following your patient along with you.       Sincerely,     Emilio Paz MD      CC: Indra Altamirano MD  ______________________________________________________________________________________    [facility]  Hand and Upper Extremity Service  Follow up visit         Follow up for: Bilateral wrists     Interval History: She returns for follow up of her bilateral wrists related to a worker's comp claim. Her physician of record is Dr. Altamirano. Overall, her wrist is fairly stable on the right side and has intermittent pain and swelling in her hand and fingers. Her left wrist is significantly more painful and wears a brace at all times on the left side because she can't tolerate movement. Since her last visit, she's gotten better control of her blood pressure with medication change from her primary care physician. She indicates she's now ready to proceed with surgery that was previously discussed with her left wrist.               Past medical history, medications, allergies, surgical history and review of systems are reviewed and otherwise unchanged when compared to last visit on 4/25/24         Examination:  Constitutional: Oriented to person, place, and time.  Appears well-developed and well-nourished.  Head: Normocephalic and atraumatic.  Eyes: Pupils are equal, round, and reactive to light.  Cardiovascular: Intact distal pulses.  Pulmonary/Chest/Breast: Effort normal. No respiratory distress.  Neurological: Alert and oriented to person, place, and  time.  Skin: Skin is warm and dry.  Psychiatric: normal mood and affect.  Behavior is normal.  Musculoskeletal: Right hand reveals healed dorsal surgical incision. Full forearm rotation without pain. Minimal tenderness to palpation around wrist joint. No significant evidence of swelling in hand or fingers. Full finger and thumb extension and full composite digital flexion. Left hand reveals relatively normal appearance of wrist and hand. Mild dorsal wrist swelling. Tenderness to palpation dorsally across the wrist. Pain with radiocarpal movement. Full forearm rotation without DRUJ instability. Slight limitations to terminal active finger flexion. Full passive finger flexion with limitations to movement related to pain and effort.       Personal Interpretation of Diagnostic studies: No new images obtained. X-rays of right wrist taken 6/11 demonstrate solid radiocarpal fusion with stable implant. X-rays of left wrist taken 10/6/22 demonstrate stage 2 SLAC arthritis.        Impression: Bilateral wrist arthritis       Plan: She believes she's now ready to proceed with surgery for the left wrist. In my opinion, the best appropriate surgery would be a proximal row carpectomy. We've discussed the goal of the surgery is to improve her pain but we may not completely eliminate it. We should be able to maintain functional wrist range of motion which is important to her given the fact that she has a wrist fusion on her right side. We'll communicate with her physician of record so we can submit a C9 so we can get her scheduled for left wrist row carpectomy once approved.       Follow up: Pending [facility] approval for surgery     For Surgical Planning:  Diagnosis: Left wrist SLAC arthritis  Procedure: Left wrist proximal row carpectomy  CPT: 24046  Anesthesia: General Plus preoperative regional block  Duration: 75 minutes  Special Equipment Needed: None  Medical Notes / PM / DM / PAT needed?:  PAT requested  Location: Any  Initial  Post Operative Visit: 2 weeks          Emilio Paz MD  [facility]  Department of Orthopaedic Surgery  Hand and Upper Extremity Reconstruction    Scribe Attestation  By signing my name below, I, Bishop Maradiaga   attest that this documentation has been prepared under the direction and in the presence of Dr. Emilio Paz.    Dictation performed with the use of voice recognition software.  Syntax and grammatical errors may exist.

## 2024-09-03 NOTE — PROGRESS NOTES
[facility]  Hand and Upper Extremity Service  Follow up visit         Follow up for: Bilateral wrists     Interval History: She returns for follow up of her bilateral wrists related to a worker's comp claim. Her physician of record is Dr. Altamirano. Overall, her wrist is fairly stable on the right side and has intermittent pain and swelling in her hand and fingers. Her left wrist is significantly more painful and wears a brace at all times on the left side because she can't tolerate movement. Since her last visit, she's gotten better control of her blood pressure with medication change from her primary care physician. She indicates she's now ready to proceed with surgery that was previously discussed with her left wrist.               Past medical history, medications, allergies, surgical history and review of systems are reviewed and otherwise unchanged when compared to last visit on 4/25/24         Examination:  Constitutional: Oriented to person, place, and time.  Appears well-developed and well-nourished.  Head: Normocephalic and atraumatic.  Eyes: Pupils are equal, round, and reactive to light.  Cardiovascular: Intact distal pulses.  Pulmonary/Chest/Breast: Effort normal. No respiratory distress.  Neurological: Alert and oriented to person, place, and time.  Skin: Skin is warm and dry.  Psychiatric: normal mood and affect.  Behavior is normal.  Musculoskeletal: Right hand reveals healed dorsal surgical incision. Full forearm rotation without pain. Minimal tenderness to palpation around wrist joint. No significant evidence of swelling in hand or fingers. Full finger and thumb extension and full composite digital flexion. Left hand reveals relatively normal appearance of wrist and hand. Mild dorsal wrist swelling. Tenderness to palpation dorsally across the wrist. Pain with radiocarpal movement. Full forearm rotation without DRUJ instability. Slight limitations to terminal active  finger flexion. Full passive finger flexion with limitations to movement related to pain and effort.       Personal Interpretation of Diagnostic studies: No new images obtained. X-rays of right wrist taken 6/11 demonstrate solid radiocarpal fusion with stable implant. X-rays of left wrist taken 10/6/22 demonstrate stage 2 SLAC arthritis.        Impression: Bilateral wrist arthritis       Plan: She believes she's now ready to proceed with surgery for the left wrist. In my opinion, the best appropriate surgery would be a proximal row carpectomy. We've discussed the goal of the surgery is to improve her pain but we may not completely eliminate it. We should be able to maintain functional wrist range of motion which is important to her given the fact that she has a wrist fusion on her right side. We'll communicate with her physician of record so we can submit a C9 so we can get her scheduled for left wrist row carpectomy once approved.       Follow up: Pending [facility] approval for surgery     For Surgical Planning:  Diagnosis: Left wrist SLAC arthritis  Procedure: Left wrist proximal row carpectomy  CPT: 42975  Anesthesia: General Plus preoperative regional block  Duration: 75 minutes  Special Equipment Needed: None  Medical Notes / PM / DM / PAT needed?:  PAT requested  Location: Any  Initial Post Operative Visit: 2 weeks          Emilio Paz MD  [facility]  Department of Orthopaedic Surgery  Hand and Upper Extremity Reconstruction    Scribe Attestation  By signing my name below, IMary Beth , [place]   attest that this documentation has been prepared under the direction and in the presence of Dr. Emilio Paz.    Dictation performed with the use of voice recognition software.  Syntax and grammatical errors may exist.

## 2024-09-03 NOTE — PROGRESS NOTES
"Subjective   Patient ID: Rosanna Mckeon is a 49 y.o. female.    HPI  Pt's Uncle  --  planned for   Last spoke to her Uncle early August;  Sudden, unexpected loss of pt's Uncle;  Also, pt reports following with Orthopedic surgeon;  Pt feels \"Hopeful\" in general;  Pt reports that she feels that she is in a \"better place than last appointment;\"  Current Outpatient Medications   Medication Instructions    albuterol 90 mcg/actuation inhaler 2 puffs, Every 6 hours PRN    albuterol 2.5 mg, nebulization, Every 4 to 6 hours as needed for wheezing    amLODIPine (Norvasc) 2.5 mg tablet 1 tablet, oral, Daily    atorvastatin (Lipitor) 20 mg tablet 1 tablet, oral, Nightly    cetirizine (ZyrTEC) 10 mg capsule 1 capsule (10 mg) once daily.    doxepin (SILENOR) 6 mg, oral, Nightly PRN    ketotifen (Zaditor) 0.025 % (0.035 %) ophthalmic solution 1 drop, Both Eyes, 2 times daily PRN    oxyCODONE-acetaminophen (Percocet) 5-325 mg tablet 1 tablet, oral, 2 times daily PRN    sertraline (ZOLOFT) 100 mg, oral, Daily    triamcinolone (Kenalog) 0.5 % cream Topical, Daily PRN    zolpidem (AMBIEN) 10 mg, oral, Nightly PRN     Patient Active Problem List   Diagnosis    Chronic patellofemoral pain of left knee    Major depression    Insomnia    Pain    Post-traumatic arthritis of right wrist    Right hand tendonitis    Anxiety    Grief    Left wrist pain    Post traumatic stress disorder    Scapholunate advanced collapse of left wrist    Posttraumatic arthritis of wrist, right             Review of Systems   Neurological:  Positive for difficulty with concentration.     Psych Review of Symptoms:    Anxiety:   Generalized Anxiety Symptoms: Difficulty controlling worry, excessive worry, difficulty with concentration and sleep disturbances due to anxiety.   Additional Anxiety Symptoms: Panic attacks.         Depressive Symptoms:   No hopelessness, no guilt, no low self esteem and no suicidal ideation.      Obsessive-Compulsive " Symptoms: Patient denied any symptoms.         Psychotic Symptoms: Patient denied any symptoms.             Objective     Physical Exam  Psychiatric:         Speech: Speech normal.         Thought Content: Thought content is not paranoid. Thought content does not include homicidal or suicidal ideation.         Judgment: Judgment normal.       Mood/affect: appropriate  Assessment/Plan   Diagnoses and all orders for this visit:  Insomnia, unspecified type  -     zolpidem (Ambien) 10 mg tablet; Take 1 tablet (10 mg) by mouth as needed at bedtime for sleep.  -     doxepin (Silenor) 6 mg tablet; Take 1 tablet (6 mg) by mouth as needed at bedtime (insomnia).  Major depressive disorder, remission status unspecified, unspecified whether recurrent  -     Follow Up In Psychiatry; Future  -     doxepin (Silenor) 6 mg tablet; Take 1 tablet (6 mg) by mouth as needed at bedtime (insomnia).

## 2024-11-27 ENCOUNTER — TELEPHONE (OUTPATIENT)
Dept: PAIN MEDICINE | Facility: CLINIC | Age: 49
End: 2024-11-27
Payer: COMMERCIAL

## 2024-11-27 NOTE — TELEPHONE ENCOUNTER
LVM FOR PATIENT TO CALL BACK.  APPT SCHEDULED FOR 12/12/24 WITH DR. GROVES.  NPV REFERRED BY DR. MARC MARQUEZ.  REQUESTED DR. MARQUEZ TO PUT IN AN EXTENSION FOR THE CONSULTATION, UNABLE TO GET THE PATIENT IN TO SEE DR. GROVES UNTIL 12/12/24, ALLOWING TIME FOR C-9 SUBMISSION AND REPLY FROM [facility].

## 2024-12-02 NOTE — TELEPHONE ENCOUNTER
April 16, 2024     Patient: Linsey Merritt   YOB: 1958   Date of Visit: 4/16/2024       To Whom it May Concern:    Linsey Merritt was seen in my clinic on 4/16/2024 .    She has the mental capacity to manage her own finances.    Sincerely,         Roula Brooks MD    Medical information is confidential and cannot be disclosed without the written consent of the patient or her representative.     "The OARRS reviewed the patient is already on Percocet 5 mg twice daily from Dr. Altamirano and she is on zolpidem from her PCP    Alexandra Valverde MD; Monica Reed  Good Morning!  I apologize, I'm just seeing this now. I previously put the ICD10 code on the appt notes.  [facility]. PATIENT REFERRED BY DR. MARC ALTAMIRANO POR. DX M14.13 . C-9 SUBMITTED BY POR FOR A CONSULT AND URINE TOX SCREEN (51160) X1 M19.131.  The history of her [facility] case is in .  Please let me know if you can see everything in her chart.  I also discovered last week that all C-9's for employers that are Self-Insured have to submit all of the claims for their employees.  They are responsible for \"administering and processing the workers' compensation claims.\"  I work on the [facility] [employer] cases for injections and office visits, but I do see that Dr. Altamirano initially put in the C-9 for the patient.  I had sent in for an extension before I found out about this process, with no reply from [employer].  Her employer should be handling this on their end.  Please let me know if you have any questions.  Thanks!  "

## 2024-12-03 ENCOUNTER — APPOINTMENT (OUTPATIENT)
Dept: BEHAVIORAL HEALTH | Facility: CLINIC | Age: 49
End: 2024-12-03
Payer: COMMERCIAL

## 2024-12-03 DIAGNOSIS — F32.9 MAJOR DEPRESSIVE DISORDER, REMISSION STATUS UNSPECIFIED, UNSPECIFIED WHETHER RECURRENT: ICD-10-CM

## 2024-12-05 NOTE — PROGRESS NOTES
[facility]  Hand and Upper Extremity Service  Follow up visit         Follow up for: Bilateral wrists     Interval History: She returns for her bilateral wrists. She previously underwent a right wrist fusion for an industrial issue and had recently requested approval for a possible row carpectomy but she was denied. She then had a hearing in September which she believes resulted in allowance for the condition on her left wrist and wants to proceed with requesting authorization for this surgery again. She also has been approved to see Dr. Gregorio Edge for second opinion and has questions in regards to scheduling this.               Past medical history, medications, allergies, surgical history and review of systems are reviewed and otherwise unchanged when compared to last visit on 9/3/24         Examination:  Constitutional: Oriented to person, place, and time.  Appears well-developed and well-nourished.  Head: Normocephalic and atraumatic.  Eyes: Pupils are equal, round, and reactive to light.  Cardiovascular: Intact distal pulses.  Pulmonary/Chest/Breast: Effort normal. No respiratory distress.  Neurological: Alert and oriented to person, place, and time.  Skin: Skin is warm and dry.  Psychiatric: normal mood and affect.  Behavior is normal.  Musculoskeletal: Bilateral wrists reveal healed dorsal surgical incision. Full forearm rotation without pain. Minimal tenderness to palpation around wrist joint. No significant evidence of swelling in hand or fingers. Full finger and thumb extension and full composite digital flexion. Left hand reveals relatively normal appearance of wrist and hand. Mild dorsal wrist swelling. Tenderness to palpation dorsally across the wrist. Pain with radiocarpal movement. Full forearm rotation without DRUJ instability. Slight limitations to terminal active finger flexion. Full passive finger flexion with limitations to movement related to pain and effort.         Impression: Scapholunate advanced collapse arthritis left wrist       Plan: She has provided us with records from the hearing which indicates that the request for allowances has been granted. Based on that information, we'll resubmit a C9 for authorization of a left wrist possible row carpectomy and will proceed with scheduling once allowed. As regards to Dr. Edge, I have advised the patient she can contact his office and schedule to see him on her own.       Follow up: Once approved for surgery     We discussed risks, benefits, alternatives and anticipated post op course including time away from work and activities following surgical treatment for the patient's condition. This is major surgery with identifiable risks. No guarantees for success have been provided. The patient has expressed understanding and has elected to pursue operative treatment.        For Surgical Planning:  Diagnosis: Left wrist arthritis  Procedure: Left wrist proximal row carpectomy  CPT: 31607  Anesthesia: General With preoperative regional block  Duration: 1 hour  Special Equipment Needed: None  Medical Notes / PM / DM / PAT needed?:  PAT requested  Location: Any  Initial Post Operative Visit: 2 weeks            Emilio Paz MD  [facility]  Department of Orthopaedic Surgery  Hand and Upper Extremity Reconstruction    Scribe Attestation  By signing my name below, IMary Beth Scribe   attest that this documentation has been prepared under the direction and in the presence of Dr. Emilio Paz.    Dictation performed with the use of voice recognition software.  Syntax and grammatical errors may exist.

## 2024-12-09 NOTE — PROGRESS NOTES
Claim #[id number]  Diagnoses are:  Posttraumatic right wrist osteoarthritis M19.131,  Traumatic right wrist ligament rupture S63.391A  Right radial stable styloid tenosynovitis M65.4  Bilateral carpal tunnel syndrome G56.03    History of Present Complaint:  The patient was referred to us by Referring Provider: Dr. Adarsh VELOZ and Dr. Paz the surgeon. this is 49 y.o.  female her friend who left the room during her exam with a past history of major depression anxiety posttraumatic stress disorder chronic patellofemoral pain of the left knee  presenting with bilateral hand and wrist pain but mostly on the right side it goes to [facility] injury 2/20/2020 and has been treated with Dr. Banks at [facility] first then with Dr. Paz at  and she had multiple surgeries in the right hand and now she is going for left carpal tunnel on the left hand.  The patient is here requesting pain medication.  I explained to her that I really do not use pain medication at this program with try to get the patient off pain medication in our comprehensive program.  I explained to her that I do not see pain medication is back pain but that is between her and Dr. Altamirano.             Procedures:   None    Portions of record reviewed for pertinent issues: active problem list, medication list, allergies, family history, social history, notes from last encounter, encounters, lab results, imaging and other available records.    I have personally reviewed the OARRS report for this patient. This report is scanned into the electronic medical record. I have considered the risks of abuse, dependence, addiction and diversion. It showed: Percocet 5 mg twice daily from Dr. Altamirano  OPIOID RISK ASSESSMENT SCORE 11/26  Aberrant behavior: none  My patient has no underlying substance abuse or alcohol abuse and there's no mental health conditions contributing to the patient's pain.        Diagnostic studies:  Multiple      Employment/disability/litigation: [facility]  case    Social History: Single 3 children and 1 grandchild, finished college behavioral science denies smoking but she is marijuana user      Review of Systems   HENT: Negative.     Eyes: Negative.    Respiratory: Negative.     Cardiovascular: Negative.    Gastrointestinal: Negative.    Endocrine: Negative.    Genitourinary: Negative.    Musculoskeletal:  Positive for arthralgias and myalgias.   Skin: Negative.    Neurological:  Positive for numbness.   Hematological: Negative.    Psychiatric/Behavioral: Negative.            Physical Exam  Vitals and nursing note reviewed.   Constitutional:       Appearance: Normal appearance.   HENT:      Head: Normocephalic and atraumatic.      Nose: Nose normal.   Eyes:      Extraocular Movements: Extraocular movements intact.      Conjunctiva/sclera: Conjunctivae normal.      Pupils: Pupils are equal, round, and reactive to light.   Cardiovascular:      Rate and Rhythm: Normal rate and regular rhythm.      Pulses: Normal pulses.      Heart sounds: Normal heart sounds.   Pulmonary:      Effort: Pulmonary effort is normal.      Breath sounds: Normal breath sounds.   Abdominal:      General: Abdomen is flat. Bowel sounds are normal.      Palpations: Abdomen is soft.   Musculoskeletal:         General: Tenderness present.   Skin:     General: Skin is warm.   Neurological:      General: No focal deficit present.      Mental Status: She is alert and oriented to person, place, and time.      Cranial Nerves: Cranial nerves 2-12 are intact.      Sensory: Sensation is intact.      Comments: Significant guarding by just touching the right arm.  There are no pseudomotor or vasomotor changes no hyperalgesia or allodynia after distraction   Psychiatric:         Mood and Affect: Mood normal.         Behavior: Behavior normal.            Assessment  Pleasant 49 years old lady with a chronic right wrist pain treated with Percocet 5 mg twice daily from [facility].  The patient looks reliable and only  takes her medication when needed.  It is up to her POR to continue with her medication but I do not see any negative sign of continuing the medication and I do not feel that she should be on this medication either..  The patient showed some guarding just touching her right arm but there is no pseudomotor or vasomotor changes there is no hyperalgesia or allodynia.  The patient is still going for another surgeries with Dr. Paz now for her left carpal tunnel release.  She told me that was sent to us for her Percocet therapy.  I explained to her with all the patches of THC and CBD and the ointment she is getting that she probably would not benefit from the opioid therapy much.  But that is up to her and Dr. Altamirano.  I recommended for her to have acupuncture to see if that would benefit in addition she may get benefit from opioid sparing infusions and is also failed cervical spinal cord stimulator trial           Plan  At least 50% of the visit was involved in the discussion of the options for treatment. We discussed exercises, medication, interventional therapies and surgery. Healthy life style is essential with patient hard work to achieve the wellness. In addition; discussion with the patient and/or family about any of the diagnostic results, impressions and/or recommended diagnostic studies, prognosis, risks and benefits of treatment options, instructions for treatment and/or follow-up, importance of compliance with chosen treatment options, risk-factor reduction, and patient/family education.           Recommended Pool therapy, walking in the pool, at least 3x per week for 30 minutes  Recommend self-directed physical therapy with at least daily exercises for minimum of 20-minute, brochure was handed to the patient  Opioid therapy to continue from her POR as needed since she is responding to it  Highly recommend a trial of acupuncture to see if that benefits  Opioid sparing infusion could be an option for  her  Cervical spinal cord stimulator all of the above failed  Healthy lifestyle and anti-inflammatory diet in addition to weight control discussed with the patient  Alternative chronic pain therapies was discussed, encouraged and information was handed  Return to Clinic 3 months       *Please note this report has been produced using speech recognition software and may contain errors related to that system including grammar, punctuation and spelling as well as words and phrases that may be inappropriate. If there are questions or concerns, please feel free to contact me to clarify.    Mari Valverde MD

## 2024-12-10 ENCOUNTER — OFFICE VISIT (OUTPATIENT)
Dept: ORTHOPEDIC SURGERY | Facility: HOSPITAL | Age: 49
End: 2024-12-10
Payer: COMMERCIAL

## 2024-12-10 VITALS — HEIGHT: 62 IN | WEIGHT: 193 LBS | BODY MASS INDEX: 35.51 KG/M2

## 2024-12-10 DIAGNOSIS — Z02.6 ENCOUNTER RELATED TO WORKER'S COMPENSATION CLAIM: Primary | ICD-10-CM

## 2024-12-10 PROCEDURE — 99213 OFFICE O/P EST LOW 20 MIN: CPT | Performed by: ORTHOPAEDIC SURGERY

## 2024-12-10 ASSESSMENT — PAIN - FUNCTIONAL ASSESSMENT: PAIN_FUNCTIONAL_ASSESSMENT: 0-10

## 2024-12-10 ASSESSMENT — PAIN SCALES - GENERAL: PAINLEVEL_OUTOF10: 5 - MODERATE PAIN

## 2024-12-10 ASSESSMENT — PAIN DESCRIPTION - DESCRIPTORS: DESCRIPTORS: ACHING;SORE

## 2024-12-10 NOTE — LETTER
December 10, 2024     Kendal Latham, APRN-CNP  [address] [facility] [address]    Patient: Rosanna Mckeon   YOB: 1975   Date of Visit: 12/10/2024       Dear Dr. Kendal Latham, APRN-CNP:    Thank you for referring Rosanna Mckeon to me for evaluation. Below are my notes for this consultation.  If you have questions, please do not hesitate to call me. I look forward to following your patient along with you.       Sincerely,     Emilio Paz MD      CC: Indra Altamirano MD  ______________________________________________________________________________________    [facility]  Hand and Upper Extremity Service  Follow up visit         Follow up for: Bilateral wrists     Interval History: She returns for her bilateral wrists. She previously underwent a right wrist fusion for an industrial issue and had recently requested approval for a possible row carpectomy but she was denied. She then had a hearing in September which she believes resulted in allowance for the condition on her left wrist and wants to proceed with requesting authorization for this surgery again. She also has been approved to see Dr. Gregorio Edge for second opinion and has questions in regards to scheduling this.               Past medical history, medications, allergies, surgical history and review of systems are reviewed and otherwise unchanged when compared to last visit on 9/3/24         Examination:  Constitutional: Oriented to person, place, and time.  Appears well-developed and well-nourished.  Head: Normocephalic and atraumatic.  Eyes: Pupils are equal, round, and reactive to light.  Cardiovascular: Intact distal pulses.  Pulmonary/Chest/Breast: Effort normal. No respiratory distress.  Neurological: Alert and oriented to person, place, and time.  Skin: Skin is warm and dry.  Psychiatric: normal mood and affect.  Behavior is normal.  Musculoskeletal:  Bilateral wrists reveal healed dorsal surgical incision. Full forearm rotation without pain. Minimal tenderness to palpation around wrist joint. No significant evidence of swelling in hand or fingers. Full finger and thumb extension and full composite digital flexion. Left hand reveals relatively normal appearance of wrist and hand. Mild dorsal wrist swelling. Tenderness to palpation dorsally across the wrist. Pain with radiocarpal movement. Full forearm rotation without DRUJ instability. Slight limitations to terminal active finger flexion. Full passive finger flexion with limitations to movement related to pain and effort.        Impression: Scapholunate advanced collapse arthritis left wrist       Plan: She has provided us with records from the hearing which indicates that the request for allowances has been granted. Based on that information, we'll resubmit a C9 for authorization of a left wrist possible row carpectomy and will proceed with scheduling once allowed. As regards to Dr. Edge, I have advised the patient she can contact his office and schedule to see him on her own.       Follow up: Once approved for surgery     We discussed risks, benefits, alternatives and anticipated post op course including time away from work and activities following surgical treatment for the patient's condition. This is major surgery with identifiable risks. No guarantees for success have been provided. The patient has expressed understanding and has elected to pursue operative treatment.        For Surgical Planning:  Diagnosis: Left wrist arthritis  Procedure: Left wrist proximal row carpectomy  CPT: 87833  Anesthesia: General With preoperative regional block  Duration: 1 hour  Special Equipment Needed: None  Medical Notes / PM / DM / PAT needed?:  PAT requested  Location: Any  Initial Post Operative Visit: 2 weeks            Emilio Paz MD  [facility]  Department of Orthopaedic  Surgery  Hand and Upper Extremity Reconstruction    Scribe Attestation  By signing my name below, I, Bishop Maradiaga   attest that this documentation has been prepared under the direction and in the presence of Dr. Emilio Paz.    Dictation performed with the use of voice recognition software.  Syntax and grammatical errors may exist.

## 2024-12-12 ENCOUNTER — OFFICE VISIT (OUTPATIENT)
Dept: PAIN MEDICINE | Facility: CLINIC | Age: 49
End: 2024-12-12
Payer: COMMERCIAL

## 2024-12-12 VITALS
WEIGHT: 186 LBS | OXYGEN SATURATION: 95 % | HEART RATE: 97 BPM | HEIGHT: 62 IN | DIASTOLIC BLOOD PRESSURE: 94 MMHG | SYSTOLIC BLOOD PRESSURE: 157 MMHG | RESPIRATION RATE: 18 BRPM | TEMPERATURE: 98.1 F | BODY MASS INDEX: 34.23 KG/M2

## 2024-12-12 DIAGNOSIS — G56.03 CARPAL TUNNEL SYNDROME, BILATERAL: ICD-10-CM

## 2024-12-12 DIAGNOSIS — M19.131 POST-TRAUMATIC OSTEOARTHRITIS OF RIGHT WRIST: Primary | ICD-10-CM

## 2024-12-12 PROCEDURE — 99214 OFFICE O/P EST MOD 30 MIN: CPT | Performed by: ANESTHESIOLOGY

## 2024-12-12 RX ORDER — ATENOLOL 50 MG/1
1 TABLET ORAL
COMMUNITY
Start: 2024-07-29

## 2024-12-12 SDOH — ECONOMIC STABILITY: FOOD INSECURITY: WITHIN THE PAST 12 MONTHS, YOU WORRIED THAT YOUR FOOD WOULD RUN OUT BEFORE YOU GOT MONEY TO BUY MORE.: NEVER TRUE

## 2024-12-12 SDOH — ECONOMIC STABILITY: FOOD INSECURITY: WITHIN THE PAST 12 MONTHS, THE FOOD YOU BOUGHT JUST DIDN'T LAST AND YOU DIDN'T HAVE MONEY TO GET MORE.: NEVER TRUE

## 2024-12-12 ASSESSMENT — LIFESTYLE VARIABLES
HOW OFTEN DO YOU HAVE SIX OR MORE DRINKS ON ONE OCCASION: NEVER
HAVE YOU OR SOMEONE ELSE BEEN INJURED AS A RESULT OF YOUR DRINKING: NO
AUDIT TOTAL SCORE: 0
HAS A RELATIVE, FRIEND, DOCTOR, OR ANOTHER HEALTH PROFESSIONAL EXPRESSED CONCERN ABOUT YOUR DRINKING OR SUGGESTED YOU CUT DOWN: NO
TOTAL SCORE: 11
HOW MANY STANDARD DRINKS CONTAINING ALCOHOL DO YOU HAVE ON A TYPICAL DAY: PATIENT DOES NOT DRINK
SKIP TO QUESTIONS 9-10: 1
AUDIT-C TOTAL SCORE: 0
HOW OFTEN DO YOU HAVE A DRINK CONTAINING ALCOHOL: NEVER

## 2024-12-12 ASSESSMENT — ENCOUNTER SYMPTOMS
DEPRESSION: 1
DECREASED NEED FOR SLEEP: 0
ENDOCRINE NEGATIVE: 1
OCCASIONAL FEELINGS OF UNSTEADINESS: 0
EYES NEGATIVE: 1
GASTROINTESTINAL NEGATIVE: 1
LOSS OF SENSATION IN FEET: 0
NUMBNESS: 1
DEPRESSED MOOD: 1
HEMATOLOGIC/LYMPHATIC NEGATIVE: 1
RESPIRATORY NEGATIVE: 1
ARTHRALGIAS: 1
MYALGIAS: 1
PSYCHIATRIC NEGATIVE: 1
CARDIOVASCULAR NEGATIVE: 1

## 2024-12-12 ASSESSMENT — PAIN SCALES - GENERAL
PAINLEVEL_OUTOF10: 8
PAINLEVEL_OUTOF10: 8

## 2024-12-12 ASSESSMENT — COLUMBIA-SUICIDE SEVERITY RATING SCALE - C-SSRS
1. IN THE PAST MONTH, HAVE YOU WISHED YOU WERE DEAD OR WISHED YOU COULD GO TO SLEEP AND NOT WAKE UP?: NO
6. HAVE YOU EVER DONE ANYTHING, STARTED TO DO ANYTHING, OR PREPARED TO DO ANYTHING TO END YOUR LIFE?: NO
2. HAVE YOU ACTUALLY HAD ANY THOUGHTS OF KILLING YOURSELF?: NO

## 2024-12-12 ASSESSMENT — PATIENT HEALTH QUESTIONNAIRE - PHQ9
SUM OF ALL RESPONSES TO PHQ9 QUESTIONS 1 AND 2: 0
2. FEELING DOWN, DEPRESSED OR HOPELESS: NOT AT ALL
1. LITTLE INTEREST OR PLEASURE IN DOING THINGS: NOT AT ALL

## 2024-12-12 ASSESSMENT — PAIN DESCRIPTION - DESCRIPTORS: DESCRIPTORS: ACHING;SHARP;THROBBING

## 2024-12-12 ASSESSMENT — PAIN - FUNCTIONAL ASSESSMENT: PAIN_FUNCTIONAL_ASSESSMENT: 0-10

## 2024-12-12 NOTE — LETTER
December 12, 2024     Indra Altamirano MD  [address] [address]  [address]  [place] [address]    Patient: Rosanna Mckeon   YOB: 1975   Date of Visit: 12/12/2024       Dear Dr. Indra Altamirano MD:    Thank you for referring Rosanna Mckeon to me for evaluation. Below are my notes for this consultation.  If you have questions, please do not hesitate to call me. I look forward to following your patient along with you.       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 Altamirano POR and Dr. Paz the surgeon. this is 49 y.o.  female her friend who left the room during her exam with a past history of major depression anxiety posttraumatic stress disorder chronic patellofemoral pain of the left knee  presenting with bilateral hand and wrist pain but mostly on the right side it goes to [facility] injury 2/20/2020 and has been treated with Dr. Banks at [facility] first then with Dr. Paz at  and she had multiple surgeries in the right hand and now she is going for left carpal tunnel on the left hand.  The patient is here requesting pain medication.  I explained to her that I really do not use pain medication at this program with try to get the patient off pain medication in our comprehensive program.  I explained to her that I do not see pain medication is back pain but that is between her and Dr. Altamirano.             Procedures:   None    Portions of record reviewed for pertinent issues: active problem list, medication list, allergies, family history, social history, notes from last encounter,  encounters, lab results, imaging and other available records.    I have personally reviewed the OARRS report for this patient. This report is scanned into the electronic medical record. I have considered the risks of abuse, dependence, addiction and diversion. It showed: Percocet 5 mg twice daily from Dr. Altamirano  OPIOID RISK ASSESSMENT SCORE 11/26  Aberrant behavior: none  My patient has no underlying substance abuse or alcohol abuse and there's no mental health conditions contributing to the patient's pain.        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     Eyes: Negative.    Respiratory: Negative.     Cardiovascular: Negative.    Gastrointestinal: Negative.    Endocrine: Negative.    Genitourinary: Negative.    Musculoskeletal:  Positive for arthralgias and myalgias.   Skin: Negative.    Neurological:  Positive for numbness.   Hematological: Negative.    Psychiatric/Behavioral: Negative.            Physical Exam  Vitals and nursing note reviewed.   Constitutional:       Appearance: Normal appearance.   HENT:      Head: Normocephalic and atraumatic.      Nose: Nose normal.   Eyes:      Extraocular Movements: Extraocular movements intact.      Conjunctiva/sclera: Conjunctivae normal.      Pupils: Pupils are equal, round, and reactive to light.   Cardiovascular:      Rate and Rhythm: Normal rate and regular rhythm.      Pulses: Normal pulses.      Heart sounds: Normal heart sounds.   Pulmonary:      Effort: Pulmonary effort is normal.      Breath sounds: Normal breath sounds.   Abdominal:      General: Abdomen is flat. Bowel sounds are normal.      Palpations: Abdomen is soft.   Musculoskeletal:         General: Tenderness present.   Skin:     General: Skin is warm.   Neurological:      General: No focal  deficit present.      Mental Status: She is alert and oriented to person, place, and time.      Cranial Nerves: Cranial nerves 2-12 are intact.      Sensory: Sensation is intact.      Comments: Significant guarding by just touching the right arm.  There are no pseudomotor or vasomotor changes no hyperalgesia or allodynia after distraction   Psychiatric:         Mood and Affect: Mood normal.         Behavior: Behavior normal.            Assessment  Pleasant 49 years old lady with a chronic right wrist pain treated with Percocet 5 mg twice daily from POR.  The patient looks reliable and only takes her medication when needed.  It is up to her POR to continue with her medication but I do not see any negative sign of continuing the medication and I do not feel that she should be on this medication either..  The patient showed some guarding just touching her right arm but there is no pseudomotor or vasomotor changes there is no hyperalgesia or allodynia.  The patient is still going for another surgeries with Dr. Paz now for her left carpal tunnel release.  She told me that was sent to us for her Percocet therapy.  I explained to her with all the patches of THC and CBD and the ointment she is getting that she probably would not benefit from the opioid therapy much.  But that is up to her and Dr. Altamirano.  I recommended for her to have acupuncture to see if that would benefit in addition she may get benefit from opioid sparing infusions and is also failed cervical spinal cord stimulator trial           Plan  At least 50% of the visit was involved in the discussion of the options for treatment. We discussed exercises, medication, interventional therapies and surgery. Healthy life style is essential with patient hard work to achieve the wellness. In addition; discussion with the patient and/or family about any of the diagnostic results, impressions and/or recommended diagnostic studies, prognosis, risks and benefits of  treatment options, instructions for treatment and/or follow-up, importance of compliance with chosen treatment options, risk-factor reduction, and patient/family education.           Recommended Pool therapy, walking in the pool, at least 3x per week for 30 minutes  Recommend self-directed physical therapy with at least daily exercises for minimum of 20-minute, brochure was handed to the patient  Opioid therapy to continue from her POR as needed since she is responding to it  Highly recommend a trial of acupuncture to see if that benefits  Opioid sparing infusion could be an option for her  Cervical spinal cord stimulator all of the above failed  Healthy lifestyle and anti-inflammatory diet in addition to weight control discussed with the patient  Alternative chronic pain therapies was discussed, encouraged and information was handed  Return to Clinic 3 months       *Please note this report has been produced using speech recognition software and may contain errors related to that system including grammar, punctuation and spelling as well as words and phrases that may be inappropriate. If there are questions or concerns, please feel free to contact me to clarify.    Mari Valverde MD

## 2024-12-12 NOTE — PROGRESS NOTES
Chief Complaint   Patient presents with    New Patient Visit     [facility]     History of Present Complaint:  The patient was referred to us by Referring Provider: Dr. Altamirano . this is 49 y.o.  female  with a past history of  major depression anxiety posttraumatic stress disorder chronic patellofemoral pain of the left knee , right hand tendinitis left wrist pain insomnia  presenting with  bilateral wrist and hand pain ,    Pain started 2020 , work related , while pulling bin  of sparkling water, however because there were to many items in the bin , her hands hit the tote .     Pain is better , hot water ,heating pad also lavender lotion with medical cannabis and transdermal patch with THC , and her percocet .  Pain is worst with repetitive movement.      The pain is described as achiness, sharp, and throbbing .      Prior Pain Therapies: chronic opioid therapy   and physical Therapy      Past surgical history:   Right wrist surgery x 3, gallbladder removal.    Employment/disability/litigation:  Patient is currently not working.  applying for disability    Social history: single, Has 3children, 1grandchildren and nogreat-grandchildren, Finished high school, Finished college major in behavioral science, and Marijuana user    Diagnostic studies:  Right wrist x-rays    Opioid Risk Assessment Score 11/26    Osmany Each Box that Applies Female Male   FAMILY HISTORY OF SUBSTANCE ABUSE  Osmany the boxes that applies   Alcohol x  1    ? 3   Illegal drugs x  2 ? 3   Rx drugs x 4 ? 4   PERSONAL HISTORY OF SUBSTNACE ABUSE   Alcohol ?  3 ?  3   Illegal drugs ?  4 ?  4    Rx drugs ?  5 ?  5   Age Between 16-45 years ?  1 ?  1   History of Preadolescent Sexual Abuse x  3 ?  0   PSYCHOLOGIC DISEASE   ADD, OCD, bipolar, schizophrenia   ?  2 ?  2   Depression x  1 ?  1   Scoring Totals 11      Scoring (Risk)  0-3 - Low  4-7 - Moderate  8 - High  Opioid Risk Assessment Score 11/26

## 2025-02-04 ENCOUNTER — APPOINTMENT (OUTPATIENT)
Dept: BEHAVIORAL HEALTH | Facility: CLINIC | Age: 50
End: 2025-02-04
Payer: COMMERCIAL

## 2025-02-04 DIAGNOSIS — G47.00 INSOMNIA, UNSPECIFIED TYPE: ICD-10-CM

## 2025-02-04 DIAGNOSIS — F32.9 MAJOR DEPRESSIVE DISORDER, REMISSION STATUS UNSPECIFIED, UNSPECIFIED WHETHER RECURRENT: ICD-10-CM

## 2025-02-04 PROCEDURE — 99214 OFFICE O/P EST MOD 30 MIN: CPT | Performed by: PSYCHIATRY & NEUROLOGY

## 2025-02-04 RX ORDER — ZOLPIDEM TARTRATE 10 MG/1
10 TABLET ORAL NIGHTLY PRN
Qty: 30 TABLET | Refills: 2 | Status: SHIPPED | OUTPATIENT
Start: 2025-02-04

## 2025-02-04 RX ORDER — SERTRALINE HYDROCHLORIDE 100 MG/1
100 TABLET, FILM COATED ORAL DAILY
Qty: 30 TABLET | Refills: 2 | Status: SHIPPED | OUTPATIENT
Start: 2025-02-04

## 2025-02-04 RX ORDER — DOXEPIN 6 MG/1
6 TABLET, FILM COATED ORAL NIGHTLY PRN
Qty: 30 TABLET | Refills: 2 | Status: SHIPPED | OUTPATIENT
Start: 2025-02-04

## 2025-02-04 NOTE — PROGRESS NOTES
Subjective   Patient ID: Rosanna Mckeon is a 49 y.o. female.    HPI  Diagnoses of Insomnia, unspecified type and Major depressive disorder, remission status unspecified, unspecified whether recurrent were pertinent to this visit.  +psychosocial stressors--- grief reaction-- complicated by prior trauma and more recent loss of family member;  Additional losses-- loss physical function due to medical factors;  Does not report SI/HI;    Review of Systems   Cardiovascular:  Negative for chest pain.   Neurological:  Negative for dizziness.   Psychiatric/Behavioral:  Positive for dysphoric mood and sleep disturbance. Negative for self-injury and suicidal ideas.        Objective   Physical Exam  Psychiatric:         Attention and Perception: Perception normal.         Speech: Speech normal.         Behavior: Behavior is cooperative.         Thought Content: Thought content is not paranoid or delusional. Thought content does not include homicidal or suicidal ideation. Thought content does not include homicidal or suicidal plan.         Judgment: Judgment normal.      mood/affect - low with some anxious distress;    Assessment/Plan   Diagnoses and all orders for this visit:  Insomnia, unspecified type  -     zolpidem (Ambien) 10 mg tablet; Take 1 tablet (10 mg) by mouth as needed at bedtime for sleep.  -     doxepin (Silenor) 6 mg tablet; Take 1 tablet (6 mg) by mouth as needed at bedtime (insomnia).  Major depressive disorder, remission status unspecified, unspecified whether recurrent  -     sertraline (Zoloft) 100 mg tablet; Take 1 tablet (100 mg) by mouth once daily.  -     doxepin (Silenor) 6 mg tablet; Take 1 tablet (6 mg) by mouth as needed at bedtime (insomnia).  -     Follow Up In Psychiatry; Future  -     Referral to Psychology; Future

## 2025-02-08 ASSESSMENT — ENCOUNTER SYMPTOMS
DIZZINESS: 0
SLEEP DISTURBANCE: 1
DYSPHORIC MOOD: 1

## 2025-03-04 ENCOUNTER — APPOINTMENT (OUTPATIENT)
Dept: BEHAVIORAL HEALTH | Facility: CLINIC | Age: 50
End: 2025-03-04
Payer: COMMERCIAL

## 2025-03-04 DIAGNOSIS — F32.9 MAJOR DEPRESSIVE DISORDER, REMISSION STATUS UNSPECIFIED, UNSPECIFIED WHETHER RECURRENT: ICD-10-CM

## 2025-03-04 NOTE — PROGRESS NOTES
Subjective   Patient ID: Rosanna Mckeon is a 49 y.o. female.    HPI  The encounter diagnosis was Major depressive disorder, remission status unspecified, unspecified whether recurrent.  Current Outpatient Medications   Medication Instructions    albuterol 90 mcg/actuation inhaler 2 puffs, Every 6 hours PRN    albuterol 2.5 mg    amLODIPine (Norvasc) 2.5 mg tablet 1 tablet, Daily    atenolol (Tenormin) 50 mg tablet 1 tablet, Daily (0630)    atorvastatin (Lipitor) 20 mg tablet 1 tablet, Nightly    cetirizine (ZyrTEC) 10 mg capsule 1 capsule (10 mg) once daily.    doxepin (SILENOR) 6 mg, oral, Nightly PRN    gabapentin (Neurontin) 300 mg capsule Take 2 caps 1 hour before the surgery then continue 2 caps 3 times daily    ketotifen (Zaditor) 0.025 % (0.035 %) ophthalmic solution 1 drop, 2 times daily PRN    loratadine (Claritin) 10 mg tablet take one tablet by mouth once daily as needed for allergy    oxyCODONE-acetaminophen (Percocet) 5-325 mg tablet 1 tablet, 2 times daily PRN    sertraline (ZOLOFT) 100 mg, oral, Daily    triamcinolone (Kenalog) 0.5 % cream Daily PRN    valACYclovir (Valtrex) 1 gram tablet     zolpidem (AMBIEN) 10 mg, oral, Nightly PRN     Specific psychotropics are:  Ambien 10 mg at bedtime  Doxepin 6 mg at bedtime  Zoloft 100 mg daily    Review of Systems   Psychiatric/Behavioral:  Positive for dysphoric mood and sleep disturbance. Negative for suicidal ideas.        Objective   Physical Exam  Psychiatric:         Behavior: Behavior is cooperative.         Thought Content: Thought content does not include homicidal or suicidal ideation.     Mood/affect: improving    Assessment/Plan   Diagnoses and all orders for this visit:  Major depressive disorder, remission status unspecified, unspecified whether recurrent  -     Follow Up In Psychiatry; Future

## 2025-03-04 NOTE — PROGRESS NOTES
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  female with PMH of major depression anxiety posttraumatic stress disorder chronic patellofemoral pain of the left knee  presenting with bilateral hand and wrist pain but mostly on the right side it goes to [facility] injury 2/20/2020 and has been treated with Dr. Banks at [facility] first then with Dr. Paz at  and she had multiple surgeries in the right hand and now she is going for left carpal tunnel on the left hand.  The patient is on Percocet 5 mg twice daily from Dr. Altamirano.  The patient is here requesting pain medication.  Back again requesting treatment for her postoperative pain that coming in the future and again refill for her Percocet that she is getting from Dr. Altamirano.  I reiterated again that she does not need pain management to give her Percocet and Dr. Altamirano can continue with the in addition the surgeon after the procedure we will do her pain management except if there is complication that needs to be addressed like doing certain blocks etc.  It is important for the patient to understand that [facility] does not recommend marijuana with opioid therapy as well.  I explained to the patient that to help her with her postoperative pain she is to take gabapentin 600 mg 1 hour before her surgery with a small sip of water and continue after that 3 times daily and that will decrease her requirement for escalated opioid.    Prior office visit:  12/12/2024:    History of Present Complaint:  The patient was referred to us by Referring Provider: Dr. Altamirano POR and Dr. Paz the surgeon. this is 49 y.o.  female her friend who left the room during her exam with a past history of major depression anxiety posttraumatic  stress disorder chronic patellofemoral pain of the left knee  presenting with bilateral hand and wrist pain but mostly on the right side it goes to [facility] injury 2/20/2020 and has been treated with Dr. Banks at [facility] first then with Dr. Paz at  and she had multiple surgeries in the right hand and now she is going for left carpal tunnel on the left hand.  The patient is here requesting pain medication.  I explained to her that I really do not use pain medication at this program with try to get the patient off pain medication in our comprehensive program.  I explained to her that I do not see pain medication is back pain but that is between her and Dr. Altamirano.  Assessment  Pleasant 49 years old lady with a chronic right wrist pain treated with Percocet 5 mg twice daily from POR.  The patient looks reliable and only takes her medication when needed.  It is up to her POR to continue with her medication but I do not see any negative sign of continuing the medication and I do not feel that she should be on this medication either..  The patient showed some guarding just touching her right arm but there is no pseudomotor or vasomotor changes there is no hyperalgesia or allodynia.  The patient is still going for another surgeries with Dr. Paz now for her left carpal tunnel release.  She told me that was sent to us for her Percocet therapy.  I explained to her with all the patches of THC and CBD and the ointment she is getting that she probably would not benefit from the opioid therapy much.  But that is up to her and Dr. Altamirano.  I recommended for her to have acupuncture to see if that would benefit in addition she may get benefit from opioid sparing infusions and is also failed cervical spinal cord stimulator trial              Procedures:   None     Portions of record reviewed for pertinent issues: active problem list, medication list, allergies, family history, social history, notes from last encounter, encounters,  lab results, imaging and other available records.     I have personally reviewed the OARRS report for this patient. This report is scanned into the electronic medical record. I have considered the risks of abuse, dependence, addiction and diversion. It showed: Percocet 5 mg twice daily from Dr. Altamirano  OPIOID RISK ASSESSMENT SCORE 11/26  Aberrant behavior: none  My patient has no underlying substance abuse or alcohol abuse and there's no mental health conditions contributing to the patient's pain.           Diagnostic studies:  Multiple        Employment/disability/litigation: [employer] case     Social History: Single 3 children and 1 grandchild, finished [employer] denies smoking but she is marijuana user        Review of Systems   HENT: Negative.     Eyes: Negative.    Respiratory: Negative.     Cardiovascular: Negative.    Gastrointestinal: Negative.    Endocrine: Negative.    Genitourinary: Negative.    Musculoskeletal:  Positive for arthralgias and myalgias.   Skin: Negative.    Neurological:  Positive for numbness.   Hematological: Negative.    Psychiatric/Behavioral: Negative.              Physical Exam  Vitals and nursing note reviewed.   Constitutional:       Appearance: Normal appearance.   HENT:      Head: Normocephalic and atraumatic.      Nose: Nose normal.   Eyes:      Extraocular Movements: Extraocular movements intact.      Conjunctiva/sclera: Conjunctivae normal.      Pupils: Pupils are equal, round, and reactive to light.   Cardiovascular:      Rate and Rhythm: Normal rate and regular rhythm.      Pulses: Normal pulses.      Heart sounds: Normal heart sounds.   Pulmonary:      Effort: Pulmonary effort is normal.      Breath sounds: Normal breath sounds.   Abdominal:      General: Abdomen is flat. Bowel sounds are normal.      Palpations: Abdomen is soft.   Musculoskeletal:         General: Tenderness present.   Skin:     General: Skin is warm.   Neurological:      General: No focal  deficit present.      Mental Status: She is alert and oriented to person, place, and time.      Cranial Nerves: Cranial nerves 2-12 are intact.      Sensory: Sensation is intact.      Comments: Significant guarding by just touching the right arm.  There are no pseudomotor or vasomotor changes no hyperalgesia or allodynia after distraction   Psychiatric:         Mood and Affect: Mood normal.         Behavior: Behavior normal.            Plan  At least 50% of the visit was involved in the discussion of the options for treatment. We discussed exercises, medication, interventional therapies and surgery. Healthy life style is essential with patient hard work to achieve the wellness. In addition; discussion with the patient and/or family about any of the diagnostic results, impressions and/or recommended diagnostic studies, prognosis, risks and benefits of treatment options, instructions for treatment and/or follow-up, importance of compliance with chosen treatment options, risk-factor reduction, and patient/family education.         Recommended Pool therapy, walking in the pool, at least 3x per week for 30 minutes  Continue self-directed physical therapy with at least daily exercises for minimum of 20-minute, brochure was handed to the patient  Continue Percocet from POR  Take gabapentin 600 mg an hour before the surgery and continued 600 mg 3 times daily  We do not take patient for chronic opioid therapy and University recommended against using opioid therapy with marijuana  Healthy lifestyle and anti-inflammatory diet in addition to weight control discussed with the patient  Alternative chronic pain therapies was discussed, encouraged and information was handed  Return to Clinic if she needs any injections or blocks etc.     *Please note this report has been produced using speech recognition software and may contain errors related to that system including grammar, punctuation and spelling as well as words and phrases  that may be inappropriate. If there are questions or concerns, please feel free to contact me to clarify.    Mari Valverde MD

## 2025-03-05 NOTE — PROGRESS NOTES
This is 49 y.o.  female with who has been treated for bilateral hand and wrist pain . Pain is unchanged, The pain is described as achiness, sharp, and stabbing and is relieved by water , heat , minimizing exertion. Here for follow-up.   Chief Complaint   Patient presents with    Follow-up       Pain Therapies: Recommended Pool therapy, walking in the pool, at least 3x per week for 30 minutes  Recommend self-directed physical therapy with at least daily exercises for minimum of 20-minute, brochure was handed to the patient  Opioid therapy to continue from her POR as jana is responding toded since she it  Highly recommend a trial of acupuncture to see if that benefits  Opioid sparing infusion could be an option for her  Cervical spinal cord stimulator all of the above failed  Healthy lifestyle and anti-inflammatory diet in addition to weight control discussed with the patient  Alternative chronic pain therapies was discussed, encouraged and information was handed  Return to Clinic 3 months

## 2025-03-06 ENCOUNTER — OFFICE VISIT (OUTPATIENT)
Dept: PAIN MEDICINE | Facility: CLINIC | Age: 50
End: 2025-03-06
Payer: COMMERCIAL

## 2025-03-06 ENCOUNTER — TELEPHONE (OUTPATIENT)
Dept: BEHAVIORAL HEALTH | Facility: CLINIC | Age: 50
End: 2025-03-06
Payer: COMMERCIAL

## 2025-03-06 VITALS
HEART RATE: 83 BPM | HEIGHT: 62 IN | DIASTOLIC BLOOD PRESSURE: 100 MMHG | TEMPERATURE: 98.1 F | SYSTOLIC BLOOD PRESSURE: 179 MMHG | WEIGHT: 186 LBS | BODY MASS INDEX: 34.23 KG/M2 | OXYGEN SATURATION: 98 % | RESPIRATION RATE: 16 BRPM

## 2025-03-06 DIAGNOSIS — M19.131 POST-TRAUMATIC OSTEOARTHRITIS OF RIGHT WRIST: Primary | ICD-10-CM

## 2025-03-06 DIAGNOSIS — S63.391A TRAUMATIC RUPTURE OF OTHER LIGAMENT OF RIGHT WRIST, INITIAL ENCOUNTER: ICD-10-CM

## 2025-03-06 PROCEDURE — 99214 OFFICE O/P EST MOD 30 MIN: CPT | Performed by: ANESTHESIOLOGY

## 2025-03-06 RX ORDER — VALACYCLOVIR HYDROCHLORIDE 1 G/1
TABLET, FILM COATED ORAL
COMMUNITY
Start: 2025-02-04

## 2025-03-06 RX ORDER — LORATADINE 10 MG/1
TABLET ORAL
COMMUNITY
Start: 2024-06-24

## 2025-03-06 RX ORDER — GABAPENTIN 300 MG/1
CAPSULE ORAL
Qty: 120 CAPSULE | Refills: 0 | Status: SHIPPED | OUTPATIENT
Start: 2025-03-06

## 2025-03-06 ASSESSMENT — ENCOUNTER SYMPTOMS
LOSS OF SENSATION IN FEET: 0
OCCASIONAL FEELINGS OF UNSTEADINESS: 0
DEPRESSION: 0

## 2025-03-06 ASSESSMENT — PAIN SCALES - GENERAL
PAINLEVEL_OUTOF10: 8
PAINLEVEL_OUTOF10: 8

## 2025-03-06 ASSESSMENT — COLUMBIA-SUICIDE SEVERITY RATING SCALE - C-SSRS
6. HAVE YOU EVER DONE ANYTHING, STARTED TO DO ANYTHING, OR PREPARED TO DO ANYTHING TO END YOUR LIFE?: NO
2. HAVE YOU ACTUALLY HAD ANY THOUGHTS OF KILLING YOURSELF?: NO
1. IN THE PAST MONTH, HAVE YOU WISHED YOU WERE DEAD OR WISHED YOU COULD GO TO SLEEP AND NOT WAKE UP?: NO

## 2025-03-06 ASSESSMENT — PAIN DESCRIPTION - DESCRIPTORS: DESCRIPTORS: ACHING;STABBING;SHARP

## 2025-03-06 ASSESSMENT — PAIN - FUNCTIONAL ASSESSMENT: PAIN_FUNCTIONAL_ASSESSMENT: 0-10

## 2025-03-08 ASSESSMENT — ENCOUNTER SYMPTOMS
DYSPHORIC MOOD: 1
SLEEP DISTURBANCE: 1

## 2025-03-20 NOTE — PROGRESS NOTES
[facility]  Hand and Upper Extremity Service  Follow up visit         Follow up for: Bilateral wrists     Interval History: She returns for her bilateral wrists. She has a [facility] claim for her right wrist and has had multiple surgeries initially by another surgeon. She underwent a right wrist arthrodesis which she's done well with but has had ongoing issues with her left wrist. 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 This continues to be denied by the bureau indicating that she was never injured at work on this wrist. She's brought paperwork to the office today with notes from a [employer] indicating in 6/25/2020 that the she had reported a left wrist injury while attempting to lift a case of sparkling water at work. Unfortunately, the [employer]OI form also dated 6/25/20 indicated that the injury was to the right wrist so there's been confusion as to which wrist was injured. She reports that she filled out an incident report by hand on the day of injury clearly indicating that the left wrist was injured. She's requested from her employer a copy of the incident report but they have been unwilling to provide it to her. Due to this confusion of whether the injury was on the left or right, the request for surgery has continued to be denied. She's become more reliant on her right hand because of the left side symptoms and because of this, she's started to get sharp-shooting pain extending out to her fingers on the right hand with pinching or grasping maneuvers. She had previously been diagnosed with bilateral carpal tunnel syndrome based on an EMG study performed by the [facility] in 2023 and she believes bilateral carpal tunnel syndrome is an allowed condition in her industrial claim.               Past medical history, medications, allergies, surgical history and review of systems are reviewed and otherwise  unchanged when compared to last visit on 12/10/24         Examination:  Constitutional: Oriented to person, place, and time.  Appears well-developed and well-nourished.  Head: Normocephalic and atraumatic.  Eyes: Pupils are equal, round, and reactive to light.  Cardiovascular: Intact distal pulses.  Pulmonary/Chest/Breast: Effort normal. No respiratory distress.  Neurological: Alert and oriented to person, place, and time.  Skin: Skin is warm and dry.  Psychiatric: normal mood and affect.  Behavior is normal.  Musculoskeletal: Right hand reveals well healed surgical incision dorsally. Full forearm rotation and full digital range of motion. No evidence of triggering. Positive João median nerve compression test and positive Tinel's sign with paresthesias in the median nerve distribution. Left wrist reveals swelling dorsally over the wrist joint. Tenderness to palpation over the wrist joint. Pain with terminal wrist extension and weakness with  strength secondary to pain.       Personal Interpretation of Diagnostic studies: No new images obtained       Impression:   Right carpal tunnel syndrome    Left wrist chronic scapholunate ligament disruption       Plan: With regard to the right wrist, if the [facility] claim is recognizing carpal tunnel syndrome then I think she would be a good candidate for carpal tunnel injection to help symptoms in the meantime. Ultimately carpal tunnel release may be necessary. With regards to the left wrist, I encouraged her to communicate with her  regarding request of these documents including the incident report to help clarify laterality of her injury. If she's able to obtain these documents, we can again attempt to request approval for proximal row carpectomy. Alternatively, if she is tired of waiting and dealing with left hand symptoms, she can proceed with left carpal tunnel release under her care source insurance while her  is continuing to get the left side claim but  before making that decision, I recommend she discuss this plan with her  to ensure that the surgery wouldn't prevent her from getting approval from the [facility] retroactively. We'll submit a C9 for right carpal tunnel injection and if that's approved, we'll get her in the office for that procedure and she'll let me know regarding her decisions for the left hand and scheduling either under her care source insurance or if she wants to keep waiting for approval through the [facility].        Follow up: Upon [facility] approval for injection              Emilio Paz MD  [facility]  Department of Orthopaedic Surgery  Hand and Upper Extremity Reconstruction    Scribe Attestation  By signing my name below, I, Bishop Maradiaga   attest that this documentation has been prepared under the direction and in the presence of Dr. Emilio Paz.    Dictation performed with the use of voice recognition software.  Syntax and grammatical errors may exist.

## 2025-03-25 ENCOUNTER — OFFICE VISIT (OUTPATIENT)
Dept: ORTHOPEDIC SURGERY | Facility: HOSPITAL | Age: 50
End: 2025-03-25
Payer: COMMERCIAL

## 2025-03-25 VITALS — WEIGHT: 186 LBS | HEIGHT: 62 IN | BODY MASS INDEX: 34.23 KG/M2

## 2025-03-25 DIAGNOSIS — M19.132 SLAC (SCAPHOLUNATE ADVANCED COLLAPSE) OF WRIST, LEFT: ICD-10-CM

## 2025-03-25 DIAGNOSIS — Z02.6 ENCOUNTER RELATED TO WORKER'S COMPENSATION CLAIM: Primary | ICD-10-CM

## 2025-03-25 DIAGNOSIS — G56.01 CARPAL TUNNEL SYNDROME ON RIGHT: ICD-10-CM

## 2025-03-25 PROCEDURE — 99214 OFFICE O/P EST MOD 30 MIN: CPT | Performed by: ORTHOPAEDIC SURGERY

## 2025-03-25 ASSESSMENT — PAIN - FUNCTIONAL ASSESSMENT: PAIN_FUNCTIONAL_ASSESSMENT: 0-10

## 2025-03-25 ASSESSMENT — PAIN DESCRIPTION - DESCRIPTORS: DESCRIPTORS: ACHING;SHARP;SORE;SHOOTING

## 2025-03-25 ASSESSMENT — PAIN SCALES - GENERAL: PAINLEVEL_OUTOF10: 5 - MODERATE PAIN

## 2025-03-25 NOTE — LETTER
March 25, 2025     BAUDILIO Tobias-CNP  [id number] [facility] [id number]    Patient: Rosanna Mckeon   YOB: 1975   Date of Visit: 3/25/2025       Dear Dr. Kendal Latham, BAUDILIO-CNP:    Thank you for referring Rosanna Mckeon to me for evaluation. Below are my notes for this consultation.  If you have questions, please do not hesitate to call me. I look forward to following your patient along with you.       Sincerely,     Emilio Paz MD      CC: Indra Altamirano MD  ______________________________________________________________________________________    [facility]  Hand and Upper Extremity Service  Follow up visit         Follow up for: Bilateral wrists     Interval History: She returns for her bilateral wrists. She has a [facility] claim for her right wrist and has had multiple surgeries initially by another surgeon. She underwent a right wrist arthrodesis which she's done well with but has had ongoing issues with her left wrist. 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 This continues to be denied by the bureau indicating that she was never injured at work on this wrist. She's brought paperwork to the office today with notes from a [employer] indicating in 6/25/2020 that the she had reported a left wrist injury while attempting to lift a case of sparkling water at work. Unfortunately, the FROI form also dated 6/25/20 indicated that the injury was to the right wrist so there's been confusion as to which wrist was injured. She reports that she filled out an incident report by hand on the day of injury clearly indicating that the left wrist was injured. She's requested from her employer a copy of the incident report but they have been unwilling to provide it to her. Due to this confusion of whether the injury was on the left or right, the request  for surgery has continued to be denied. She's become more reliant on her right hand because of the left side symptoms and because of this, she's started to get sharp-shooting pain extending out to her fingers on the right hand with pinching or grasping maneuvers. She had previously been diagnosed with bilateral carpal tunnel syndrome based on an EMG study performed by the [facility] in 2023 and she believes bilateral carpal tunnel syndrome is an allowed condition in her industrial claim.               Past medical history, medications, allergies, surgical history and review of systems are reviewed and otherwise unchanged when compared to last visit on 12/10/24         Examination:  Constitutional: Oriented to person, place, and time.  Appears well-developed and well-nourished.  Head: Normocephalic and atraumatic.  Eyes: Pupils are equal, round, and reactive to light.  Cardiovascular: Intact distal pulses.  Pulmonary/Chest/Breast: Effort normal. No respiratory distress.  Neurological: Alert and oriented to person, place, and time.  Skin: Skin is warm and dry.  Psychiatric: normal mood and affect.  Behavior is normal.  Musculoskeletal: Right hand reveals well healed surgical incision dorsally. Full forearm rotation and full digital range of motion. No evidence of triggering. Positive João median nerve compression test and positive Tinel's sign with paresthesias in the median nerve distribution. Left wrist reveals swelling dorsally over the wrist joint. Tenderness to palpation over the wrist joint. Pain with terminal wrist extension and weakness with  strength secondary to pain.       Personal Interpretation of Diagnostic studies: No new images obtained       Impression:   Right carpal tunnel syndrome    Left wrist chronic scapholunate ligament disruption       Plan: With regard to the right wrist, if the [facility] claim is recognizing carpal tunnel syndrome then I think she would be a good candidate for carpal  tunnel injection to help symptoms in the meantime. Ultimately carpal tunnel release may be necessary. With regards to the left wrist, I encouraged her to communicate with her  regarding request of these documents including the incident report to help clarify laterality of her injury. If she's able to obtain these documents, we can again attempt to request approval for proximal row carpectomy. Alternatively, if she is tired of waiting and dealing with left hand symptoms, she can proceed with left carpal tunnel release under her care source insurance while her  is continuing to get the left side claim but before making that decision, I recommend she discuss this plan with her  to ensure that the surgery wouldn't prevent her from getting approval from the [facility] retroactively. We'll submit a C9 for right carpal tunnel injection and if that's approved, we'll get her in the office for that procedure and she'll let me know regarding her decisions for the left hand and scheduling either under her care source insurance or if she wants to keep waiting for approval through the [facility].        Follow up: Upon [facility] approval for injection              Emilio Paz MD  [facility]  Department of Orthopaedic Surgery  Hand and Upper Extremity Reconstruction    Scribe Attestation  By signing my name below, I, Mary Beth Lim , [employer]   attest that this documentation has been prepared under the direction and in the presence of Dr. Emilio Paz.    Dictation performed with the use of voice recognition software.  Syntax and grammatical errors may exist.

## 2025-05-06 ENCOUNTER — APPOINTMENT (OUTPATIENT)
Dept: BEHAVIORAL HEALTH | Facility: CLINIC | Age: 50
End: 2025-05-06
Payer: COMMERCIAL

## 2025-05-06 DIAGNOSIS — G47.00 INSOMNIA, UNSPECIFIED TYPE: ICD-10-CM

## 2025-05-06 DIAGNOSIS — F32.9 MAJOR DEPRESSIVE DISORDER, REMISSION STATUS UNSPECIFIED, UNSPECIFIED WHETHER RECURRENT: ICD-10-CM

## 2025-05-06 PROCEDURE — 99214 OFFICE O/P EST MOD 30 MIN: CPT | Performed by: PSYCHIATRY & NEUROLOGY

## 2025-05-11 RX ORDER — DOXEPIN 6 MG/1
6 TABLET, FILM COATED ORAL NIGHTLY PRN
Qty: 30 TABLET | Refills: 2 | Status: SHIPPED | OUTPATIENT
Start: 2025-05-11

## 2025-05-11 RX ORDER — SERTRALINE HYDROCHLORIDE 100 MG/1
100 TABLET, FILM COATED ORAL DAILY
Qty: 30 TABLET | Refills: 2 | Status: SHIPPED | OUTPATIENT
Start: 2025-05-11

## 2025-05-11 RX ORDER — ZOLPIDEM TARTRATE 10 MG/1
10 TABLET ORAL NIGHTLY PRN
Qty: 30 TABLET | Refills: 2 | Status: SHIPPED | OUTPATIENT
Start: 2025-05-11

## 2025-05-11 ASSESSMENT — ENCOUNTER SYMPTOMS
DYSPHORIC MOOD: 1
NERVOUS/ANXIOUS: 1
HALLUCINATIONS: 0

## 2025-06-10 ENCOUNTER — APPOINTMENT (OUTPATIENT)
Dept: BEHAVIORAL HEALTH | Facility: CLINIC | Age: 50
End: 2025-06-10
Payer: COMMERCIAL

## 2025-06-10 DIAGNOSIS — F32.9 MAJOR DEPRESSIVE DISORDER, REMISSION STATUS UNSPECIFIED, UNSPECIFIED WHETHER RECURRENT: ICD-10-CM

## 2025-06-10 PROCEDURE — 99214 OFFICE O/P EST MOD 30 MIN: CPT | Performed by: PSYCHIATRY & NEUROLOGY

## 2025-06-10 NOTE — PROGRESS NOTES
Subjective   Patient ID: Rosanna Mckeon is a 49 y.o. female.    HPI  Patient Active Problem List    Diagnosis Date Noted    Chronic patellofemoral pain of left knee 08/16/2023    Major depression 08/16/2023    Insomnia 08/16/2023    Pain 08/16/2023    Post-traumatic arthritis of right wrist 08/16/2023    Right hand tendonitis 08/16/2023    Anxiety 08/16/2023    Grief 08/16/2023    Left wrist pain 08/16/2023    Post traumatic stress disorder 08/16/2023    Scapholunate advanced collapse of left wrist 08/16/2023    Posttraumatic arthritis of wrist, right 12/15/2023     Current Outpatient Medications   Medication Instructions    albuterol 90 mcg/actuation inhaler 2 puffs, Every 6 hours PRN    albuterol 2.5 mg    amLODIPine (Norvasc) 2.5 mg tablet 1 tablet, Daily    atenolol (Tenormin) 50 mg tablet 1 tablet, Daily (0630)    atorvastatin (Lipitor) 20 mg tablet 1 tablet, Nightly    cetirizine (ZyrTEC) 10 mg capsule 1 capsule (10 mg) once daily.    doxepin (SILENOR) 6 mg, oral, Nightly PRN    gabapentin (Neurontin) 300 mg capsule Take 2 caps 1 hour before the surgery then continue 2 caps 3 times daily    ketotifen (Zaditor) 0.025 % (0.035 %) ophthalmic solution 1 drop, 2 times daily PRN    loratadine (Claritin) 10 mg tablet take one tablet by mouth once daily as needed for allergy    oxyCODONE-acetaminophen (Percocet) 5-325 mg tablet 1 tablet, 2 times daily PRN    sertraline (ZOLOFT) 100 mg, oral, Daily    triamcinolone (Kenalog) 0.5 % cream Daily PRN    valACYclovir (Valtrex) 1 gram tablet     zolpidem (AMBIEN) 10 mg, oral, Nightly PRN     +stressors-- coping-- family supports; managing physical health;    Review of Systems   Psychiatric/Behavioral:  Positive for dysphoric mood. Negative for hallucinations, self-injury and suicidal ideas. The patient is nervous/anxious.        Objective   Physical Exam  Psychiatric:         Attention and Perception: She does not perceive auditory hallucinations.         Mood and Affect:  Mood is depressed.         Behavior: Behavior normal.         Thought Content: Thought content does not include homicidal or suicidal ideation.       Assessment/Plan   Cont:  Zolpidem 10 mg at bedtime  Zoloft 100 mg daily  Doxepin 6 mg at bedtime  Next visit: 7/15/25  Diagnoses and all orders for this visit:  Major depressive disorder, remission status unspecified, unspecified whether recurrent  -     Follow Up In Psychiatry  -     Follow Up In Psychiatry; Future

## 2025-06-12 ASSESSMENT — ENCOUNTER SYMPTOMS
HALLUCINATIONS: 0
NERVOUS/ANXIOUS: 1
DYSPHORIC MOOD: 1

## 2025-07-15 ENCOUNTER — APPOINTMENT (OUTPATIENT)
Dept: BEHAVIORAL HEALTH | Facility: CLINIC | Age: 50
End: 2025-07-15
Payer: COMMERCIAL

## 2025-08-01 DIAGNOSIS — M19.132 SLAC (SCAPHOLUNATE ADVANCED COLLAPSE) WRIST, LEFT: ICD-10-CM

## 2025-08-11 ENCOUNTER — APPOINTMENT (OUTPATIENT)
Dept: ORTHOPEDIC SURGERY | Facility: CLINIC | Age: 50
End: 2025-08-11

## 2025-08-13 ENCOUNTER — ANESTHESIA EVENT (OUTPATIENT)
Dept: OPERATING ROOM | Facility: HOSPITAL | Age: 50
End: 2025-08-13
Payer: COMMERCIAL

## 2025-08-13 ENCOUNTER — ANESTHESIA (OUTPATIENT)
Dept: OPERATING ROOM | Facility: HOSPITAL | Age: 50
End: 2025-08-13
Payer: COMMERCIAL

## 2025-08-13 RX ORDER — PROPOFOL 10 MG/ML
INJECTION, EMULSION INTRAVENOUS CONTINUOUS PRN
Status: DISCONTINUED | OUTPATIENT
Start: 2025-08-13 | End: 2025-08-15 | Stop reason: HOSPADM

## 2025-08-25 ENCOUNTER — APPOINTMENT (OUTPATIENT)
Dept: BEHAVIORAL HEALTH | Facility: CLINIC | Age: 50
End: 2025-08-25
Payer: COMMERCIAL

## 2025-08-31 ASSESSMENT — ENCOUNTER SYMPTOMS
DYSPHORIC MOOD: 1
HALLUCINATIONS: 0
NERVOUS/ANXIOUS: 1

## 2025-09-08 ENCOUNTER — APPOINTMENT (OUTPATIENT)
Dept: BEHAVIORAL HEALTH | Facility: CLINIC | Age: 50
End: 2025-09-08
Payer: COMMERCIAL

## (undated) DEVICE — Device

## (undated) DEVICE — 22CM X 45CM, LARGE DELUXE ARM SLING W/PAD

## (undated) DEVICE — BLANKET, LOWER BODY, VHA PLUS, ADULT